# Patient Record
Sex: FEMALE | Race: WHITE | NOT HISPANIC OR LATINO | Employment: FULL TIME | URBAN - METROPOLITAN AREA
[De-identification: names, ages, dates, MRNs, and addresses within clinical notes are randomized per-mention and may not be internally consistent; named-entity substitution may affect disease eponyms.]

---

## 2019-02-27 ENCOUNTER — HOSPITAL ENCOUNTER (OUTPATIENT)
Dept: RADIOLOGY | Facility: MEDICAL CENTER | Age: 54
End: 2019-02-27
Attending: PHYSICIAN ASSISTANT
Payer: COMMERCIAL

## 2019-02-27 DIAGNOSIS — R10.11 ABDOMINAL PAIN, RIGHT UPPER QUADRANT: ICD-10-CM

## 2019-02-27 PROCEDURE — 76705 ECHO EXAM OF ABDOMEN: CPT

## 2019-03-04 ENCOUNTER — HOSPITAL ENCOUNTER (OUTPATIENT)
Dept: RADIOLOGY | Facility: MEDICAL CENTER | Age: 54
End: 2019-03-04
Attending: PHYSICIAN ASSISTANT
Payer: COMMERCIAL

## 2019-03-04 DIAGNOSIS — R11.2 NAUSEA AND VOMITING, INTRACTABILITY OF VOMITING NOT SPECIFIED, UNSPECIFIED VOMITING TYPE: ICD-10-CM

## 2019-03-04 DIAGNOSIS — R10.11 RIGHT UPPER QUADRANT PAIN: ICD-10-CM

## 2019-03-04 PROCEDURE — A9537 TC99M MEBROFENIN: HCPCS

## 2019-03-27 ENCOUNTER — APPOINTMENT (OUTPATIENT)
Dept: ADMISSIONS | Facility: MEDICAL CENTER | Age: 54
End: 2019-03-27
Payer: COMMERCIAL

## 2019-03-28 ENCOUNTER — ANESTHESIA EVENT (OUTPATIENT)
Dept: SURGERY | Facility: MEDICAL CENTER | Age: 54
End: 2019-03-28
Payer: COMMERCIAL

## 2019-03-28 DIAGNOSIS — Z01.812 PRE-OPERATIVE LABORATORY EXAMINATION: ICD-10-CM

## 2019-03-28 LAB
ANION GAP SERPL CALC-SCNC: 2 MMOL/L (ref 0–11.9)
BUN SERPL-MCNC: 7 MG/DL (ref 8–22)
CALCIUM SERPL-MCNC: 9.7 MG/DL (ref 8.5–10.5)
CHLORIDE SERPL-SCNC: 106 MMOL/L (ref 96–112)
CO2 SERPL-SCNC: 28 MMOL/L (ref 20–33)
CREAT SERPL-MCNC: 0.7 MG/DL (ref 0.5–1.4)
ERYTHROCYTE [DISTWIDTH] IN BLOOD BY AUTOMATED COUNT: 43.8 FL (ref 35.9–50)
GLUCOSE SERPL-MCNC: 78 MG/DL (ref 65–99)
HCT VFR BLD AUTO: 41.5 % (ref 37–47)
HGB BLD-MCNC: 13.3 G/DL (ref 12–16)
MCH RBC QN AUTO: 27.4 PG (ref 27–33)
MCHC RBC AUTO-ENTMCNC: 32 G/DL (ref 33.6–35)
MCV RBC AUTO: 85.6 FL (ref 81.4–97.8)
PLATELET # BLD AUTO: 308 K/UL (ref 164–446)
PMV BLD AUTO: 10.8 FL (ref 9–12.9)
POTASSIUM SERPL-SCNC: 4.4 MMOL/L (ref 3.6–5.5)
RBC # BLD AUTO: 4.85 M/UL (ref 4.2–5.4)
SODIUM SERPL-SCNC: 136 MMOL/L (ref 135–145)
WBC # BLD AUTO: 8.5 K/UL (ref 4.8–10.8)

## 2019-03-28 PROCEDURE — 85027 COMPLETE CBC AUTOMATED: CPT

## 2019-03-28 PROCEDURE — 80048 BASIC METABOLIC PNL TOTAL CA: CPT

## 2019-03-28 PROCEDURE — 36415 COLL VENOUS BLD VENIPUNCTURE: CPT

## 2019-03-28 RX ORDER — HYDROXYZINE 50 MG/1
100 TABLET, FILM COATED ORAL 2 TIMES DAILY
COMMUNITY
End: 2022-05-18

## 2019-03-28 RX ORDER — TRAMADOL HYDROCHLORIDE 50 MG/1
50 TABLET ORAL EVERY 4 HOURS PRN
COMMUNITY
End: 2022-05-18

## 2019-03-28 RX ORDER — GABAPENTIN 300 MG/1
300 CAPSULE ORAL 3 TIMES DAILY
COMMUNITY
End: 2022-05-18

## 2019-03-28 RX ORDER — CYCLOBENZAPRINE HCL 10 MG
10 TABLET ORAL 3 TIMES DAILY
COMMUNITY
End: 2022-09-27 | Stop reason: SDUPTHER

## 2019-03-28 RX ORDER — NAPROXEN 500 MG/1
500 TABLET ORAL 2 TIMES DAILY WITH MEALS
COMMUNITY
End: 2022-05-18

## 2019-03-29 ENCOUNTER — ANESTHESIA (OUTPATIENT)
Dept: SURGERY | Facility: MEDICAL CENTER | Age: 54
End: 2019-03-29
Payer: COMMERCIAL

## 2019-03-29 ENCOUNTER — HOSPITAL ENCOUNTER (OUTPATIENT)
Facility: MEDICAL CENTER | Age: 54
End: 2019-03-29
Attending: SURGERY | Admitting: SURGERY
Payer: COMMERCIAL

## 2019-03-29 VITALS
SYSTOLIC BLOOD PRESSURE: 146 MMHG | RESPIRATION RATE: 14 BRPM | BODY MASS INDEX: 24.36 KG/M2 | DIASTOLIC BLOOD PRESSURE: 94 MMHG | HEART RATE: 66 BPM | TEMPERATURE: 97.7 F | WEIGHT: 155.2 LBS | OXYGEN SATURATION: 96 % | HEIGHT: 67 IN

## 2019-03-29 DIAGNOSIS — G89.18 POST-OPERATIVE PAIN: ICD-10-CM

## 2019-03-29 LAB — PATHOLOGY CONSULT NOTE: NORMAL

## 2019-03-29 PROCEDURE — 160047 HCHG PACU  - EA ADDL 30 MINS PHASE II: Performed by: SURGERY

## 2019-03-29 PROCEDURE — 700101 HCHG RX REV CODE 250: Performed by: ANESTHESIOLOGY

## 2019-03-29 PROCEDURE — 700111 HCHG RX REV CODE 636 W/ 250 OVERRIDE (IP)

## 2019-03-29 PROCEDURE — 160002 HCHG RECOVERY MINUTES (STAT): Performed by: SURGERY

## 2019-03-29 PROCEDURE — 160048 HCHG OR STATISTICAL LEVEL 1-5: Performed by: SURGERY

## 2019-03-29 PROCEDURE — 160028 HCHG SURGERY MINUTES - 1ST 30 MINS LEVEL 3: Performed by: SURGERY

## 2019-03-29 PROCEDURE — 700102 HCHG RX REV CODE 250 W/ 637 OVERRIDE(OP): Performed by: ANESTHESIOLOGY

## 2019-03-29 PROCEDURE — 502571 HCHG PACK, LAP CHOLE: Performed by: SURGERY

## 2019-03-29 PROCEDURE — 501399 HCHG SPECIMAN BAG, ENDO CATC: Performed by: SURGERY

## 2019-03-29 PROCEDURE — 700111 HCHG RX REV CODE 636 W/ 250 OVERRIDE (IP): Performed by: ANESTHESIOLOGY

## 2019-03-29 PROCEDURE — 700101 HCHG RX REV CODE 250

## 2019-03-29 PROCEDURE — 160046 HCHG PACU - 1ST 60 MINS PHASE II: Performed by: SURGERY

## 2019-03-29 PROCEDURE — 160009 HCHG ANES TIME/MIN: Performed by: SURGERY

## 2019-03-29 PROCEDURE — A6402 STERILE GAUZE <= 16 SQ IN: HCPCS | Performed by: SURGERY

## 2019-03-29 PROCEDURE — 501582 HCHG TROCAR, THRD BLADED: Performed by: SURGERY

## 2019-03-29 PROCEDURE — 160025 RECOVERY II MINUTES (STATS): Performed by: SURGERY

## 2019-03-29 PROCEDURE — 500854 HCHG NEEDLE, INSUFFLATION FOR STEP: Performed by: SURGERY

## 2019-03-29 PROCEDURE — 160036 HCHG PACU - EA ADDL 30 MINS PHASE I: Performed by: SURGERY

## 2019-03-29 PROCEDURE — 160035 HCHG PACU - 1ST 60 MINS PHASE I: Performed by: SURGERY

## 2019-03-29 PROCEDURE — A9270 NON-COVERED ITEM OR SERVICE: HCPCS | Performed by: ANESTHESIOLOGY

## 2019-03-29 PROCEDURE — 88304 TISSUE EXAM BY PATHOLOGIST: CPT

## 2019-03-29 PROCEDURE — 160039 HCHG SURGERY MINUTES - EA ADDL 1 MIN LEVEL 3: Performed by: SURGERY

## 2019-03-29 PROCEDURE — 501838 HCHG SUTURE GENERAL: Performed by: SURGERY

## 2019-03-29 RX ORDER — ONDANSETRON 2 MG/ML
INJECTION INTRAMUSCULAR; INTRAVENOUS PRN
Status: DISCONTINUED | OUTPATIENT
Start: 2019-03-29 | End: 2019-03-29 | Stop reason: SURG

## 2019-03-29 RX ORDER — BUPIVACAINE HYDROCHLORIDE AND EPINEPHRINE 5; 5 MG/ML; UG/ML
INJECTION, SOLUTION EPIDURAL; INTRACAUDAL; PERINEURAL
Status: DISCONTINUED | OUTPATIENT
Start: 2019-03-29 | End: 2019-03-29 | Stop reason: HOSPADM

## 2019-03-29 RX ORDER — HYDROMORPHONE HYDROCHLORIDE 1 MG/ML
0.1 INJECTION, SOLUTION INTRAMUSCULAR; INTRAVENOUS; SUBCUTANEOUS
Status: DISCONTINUED | OUTPATIENT
Start: 2019-03-29 | End: 2019-03-29 | Stop reason: HOSPADM

## 2019-03-29 RX ORDER — MEPERIDINE HYDROCHLORIDE 25 MG/ML
6.25 INJECTION INTRAMUSCULAR; INTRAVENOUS; SUBCUTANEOUS
Status: DISCONTINUED | OUTPATIENT
Start: 2019-03-29 | End: 2019-03-29 | Stop reason: HOSPADM

## 2019-03-29 RX ORDER — DIPHENHYDRAMINE HYDROCHLORIDE 50 MG/ML
12.5 INJECTION INTRAMUSCULAR; INTRAVENOUS
Status: DISCONTINUED | OUTPATIENT
Start: 2019-03-29 | End: 2019-03-29 | Stop reason: HOSPADM

## 2019-03-29 RX ORDER — SODIUM CHLORIDE, SODIUM LACTATE, POTASSIUM CHLORIDE, CALCIUM CHLORIDE 600; 310; 30; 20 MG/100ML; MG/100ML; MG/100ML; MG/100ML
INJECTION, SOLUTION INTRAVENOUS CONTINUOUS
Status: DISCONTINUED | OUTPATIENT
Start: 2019-03-29 | End: 2019-03-29 | Stop reason: HOSPADM

## 2019-03-29 RX ORDER — CELECOXIB 200 MG/1
400 CAPSULE ORAL ONCE
Status: COMPLETED | OUTPATIENT
Start: 2019-03-29 | End: 2019-03-29

## 2019-03-29 RX ORDER — DEXAMETHASONE SODIUM PHOSPHATE 4 MG/ML
INJECTION, SOLUTION INTRA-ARTICULAR; INTRALESIONAL; INTRAMUSCULAR; INTRAVENOUS; SOFT TISSUE PRN
Status: DISCONTINUED | OUTPATIENT
Start: 2019-03-29 | End: 2019-03-29 | Stop reason: SURG

## 2019-03-29 RX ORDER — OXYCODONE HCL 5 MG/5 ML
5 SOLUTION, ORAL ORAL
Status: COMPLETED | OUTPATIENT
Start: 2019-03-29 | End: 2019-03-29

## 2019-03-29 RX ORDER — ONDANSETRON 2 MG/ML
4 INJECTION INTRAMUSCULAR; INTRAVENOUS
Status: COMPLETED | OUTPATIENT
Start: 2019-03-29 | End: 2019-03-29

## 2019-03-29 RX ORDER — CEFAZOLIN SODIUM 1 G/3ML
INJECTION, POWDER, FOR SOLUTION INTRAMUSCULAR; INTRAVENOUS PRN
Status: DISCONTINUED | OUTPATIENT
Start: 2019-03-29 | End: 2019-03-29 | Stop reason: SURG

## 2019-03-29 RX ORDER — HYDROCODONE BITARTRATE AND ACETAMINOPHEN 5; 325 MG/1; MG/1
1-2 TABLET ORAL EVERY 4 HOURS PRN
Qty: 30 TAB | Refills: 0 | Status: SHIPPED | OUTPATIENT
Start: 2019-03-29 | End: 2019-04-05

## 2019-03-29 RX ORDER — ACETAMINOPHEN 500 MG
1000 TABLET ORAL ONCE
Status: COMPLETED | OUTPATIENT
Start: 2019-03-29 | End: 2019-03-29

## 2019-03-29 RX ORDER — OXYCODONE HCL 5 MG/5 ML
10 SOLUTION, ORAL ORAL
Status: COMPLETED | OUTPATIENT
Start: 2019-03-29 | End: 2019-03-29

## 2019-03-29 RX ORDER — HYDROMORPHONE HYDROCHLORIDE 1 MG/ML
0.2 INJECTION, SOLUTION INTRAMUSCULAR; INTRAVENOUS; SUBCUTANEOUS
Status: DISCONTINUED | OUTPATIENT
Start: 2019-03-29 | End: 2019-03-29 | Stop reason: HOSPADM

## 2019-03-29 RX ORDER — HALOPERIDOL 5 MG/ML
1 INJECTION INTRAMUSCULAR
Status: DISCONTINUED | OUTPATIENT
Start: 2019-03-29 | End: 2019-03-29 | Stop reason: HOSPADM

## 2019-03-29 RX ORDER — HYDROMORPHONE HYDROCHLORIDE 1 MG/ML
0.4 INJECTION, SOLUTION INTRAMUSCULAR; INTRAVENOUS; SUBCUTANEOUS
Status: DISCONTINUED | OUTPATIENT
Start: 2019-03-29 | End: 2019-03-29 | Stop reason: HOSPADM

## 2019-03-29 RX ADMIN — CEFAZOLIN 2 G: 330 INJECTION, POWDER, FOR SOLUTION INTRAMUSCULAR; INTRAVENOUS at 08:38

## 2019-03-29 RX ADMIN — FENTANYL CITRATE 50 MCG: 50 INJECTION, SOLUTION INTRAMUSCULAR; INTRAVENOUS at 09:00

## 2019-03-29 RX ADMIN — MIDAZOLAM HYDROCHLORIDE 2 MG: 1 INJECTION, SOLUTION INTRAMUSCULAR; INTRAVENOUS at 08:30

## 2019-03-29 RX ADMIN — ONDANSETRON 4 MG: 2 INJECTION INTRAMUSCULAR; INTRAVENOUS at 10:05

## 2019-03-29 RX ADMIN — FENTANYL CITRATE 50 MCG: 50 INJECTION, SOLUTION INTRAMUSCULAR; INTRAVENOUS at 08:55

## 2019-03-29 RX ADMIN — FENTANYL CITRATE 100 MCG: 50 INJECTION, SOLUTION INTRAMUSCULAR; INTRAVENOUS at 08:43

## 2019-03-29 RX ADMIN — FENTANYL CITRATE 50 MCG: 50 INJECTION, SOLUTION INTRAMUSCULAR; INTRAVENOUS at 10:59

## 2019-03-29 RX ADMIN — ONDANSETRON 4 MG: 2 INJECTION INTRAMUSCULAR; INTRAVENOUS at 08:50

## 2019-03-29 RX ADMIN — ROCURONIUM BROMIDE 50 MG: 10 INJECTION, SOLUTION INTRAVENOUS at 08:43

## 2019-03-29 RX ADMIN — SODIUM CHLORIDE, SODIUM LACTATE, POTASSIUM CHLORIDE, CALCIUM CHLORIDE: 600; 310; 30; 20 INJECTION, SOLUTION INTRAVENOUS at 08:25

## 2019-03-29 RX ADMIN — PROPOFOL 200 MG: 10 INJECTION, EMULSION INTRAVENOUS at 08:43

## 2019-03-29 RX ADMIN — ACETAMINOPHEN 1000 MG: 500 TABLET, FILM COATED ORAL at 07:49

## 2019-03-29 RX ADMIN — FENTANYL CITRATE 50 MCG: 50 INJECTION, SOLUTION INTRAMUSCULAR; INTRAVENOUS at 08:50

## 2019-03-29 RX ADMIN — SUGAMMADEX 200 MG: 100 INJECTION, SOLUTION INTRAVENOUS at 09:05

## 2019-03-29 RX ADMIN — DEXAMETHASONE SODIUM PHOSPHATE 8 MG: 4 INJECTION, SOLUTION INTRAMUSCULAR; INTRAVENOUS at 08:45

## 2019-03-29 RX ADMIN — CELECOXIB 400 MG: 200 CAPSULE ORAL at 07:49

## 2019-03-29 RX ADMIN — OXYCODONE HYDROCHLORIDE 5 MG: 5 SOLUTION ORAL at 11:08

## 2019-03-29 RX ADMIN — SODIUM CHLORIDE, SODIUM LACTATE, POTASSIUM CHLORIDE, CALCIUM CHLORIDE: 600; 310; 30; 20 INJECTION, SOLUTION INTRAVENOUS at 06:31

## 2019-03-29 ASSESSMENT — PAIN SCALES - GENERAL: PAIN_LEVEL: 0

## 2019-03-29 NOTE — ANESTHESIA PREPROCEDURE EVALUATION
Biliary dyskinesia    Relevant Problems   (+) ALCOHOL ABUSE, IN REMISSION   (+) Depression   asthma    Physical Exam    Airway   Mallampati: I  TM distance: >3 FB  Neck ROM: full       Cardiovascular - normal exam  Rhythm: regular  Rate: normal  (-) murmur     Dental - normal exam  (+) upper dentures, lower dentures         Pulmonary - normal exam  Breath sounds clear to auscultation     Abdominal    Neurological - normal exam                 Anesthesia Plan    ASA 2       Plan - general       Airway plan will be ETT        Induction: intravenous    Postoperative Plan: Postoperative administration of opioids is intended.    Pertinent diagnostic labs and testing reviewed    Informed Consent:    Anesthetic plan and risks discussed with patient.    Use of blood products discussed with: patient whom consented to blood products.

## 2019-03-29 NOTE — OR NURSING
0919 Patient arrived from OR on gurney. Report received from anesthesia and RN. Oral airway in place. Will continue to monitor.   0958 pt woke up, airway out.   1200  at bedside, d/c instructions reviewed with pt and , copy given. Per pt she get very nauseated with carmen, Dr. Enamorado in OR, notified.   1300 Pt got prescription for percocet.  1315 d/c criteria met, PIV out, d/cd via sara tinajero with patient.

## 2019-03-29 NOTE — DISCHARGE INSTRUCTIONS
ACTIVITY: Rest and take it easy for the first 24 hours.  A responsible adult is recommended to remain with you during that time.  It is normal to feel sleepy.  We encourage you to not do anything that requires balance, judgment or coordination.    MILD FLU-LIKE SYMPTOMS ARE NORMAL. YOU MAY EXPERIENCE GENERALIZED MUSCLE ACHES, THROAT IRRITATION, HEADACHE AND/OR SOME NAUSEA.    FOR 24 HOURS DO NOT:  Drive, operate machinery or run household appliances.  Drink beer or alcoholic beverages.   Make important decisions or sign legal documents.    SPECIAL INSTRUCTIONS: Laparoscopic Cholecystectomy D/C instructions:     1. DIET: Upon discharge from the hospital you may resume your normal preoperative diet. Depending on how you are feeling and whether you have nausea or not, you may wish to stay with a bland diet for the first few days. However, you can advance this as quickly as you feel ready.     2. ACTIVITIES: After discharge from the hospital, you may resume full routine activities. However, there should be no heavy lifting (greater than 15 pounds) and no strenuous activities until after your follow-up visit. Otherwise, routine activities of daily living are acceptable.     3. DRIVING: You may drive whenever you are off pain medications and are able to perform the activities needed to drive, i.e. turning, bending, twisting, etc.     4. BATHING: You may get the wound wet at any time after leaving the hospital. You may shower, but do not submerge in a bath for at least a week. Dressings may come off after 48 hours.     5. BOWEL FUNCTION: A few patients, after this operation, will develop either frequent or loose stools after meals. This usually corrects itself after a few days, to a few weeks. If this occurs, do not worry; it is not unusual and will resolve. Much more common than loose stools, is constipation. The combination of pain medication and decreased activity level can cause constipation in otherwise normal  patients. If you feel this is occurring, take a laxative (Milk of Magnesia, Ex-Lax, Senokot, etc.) until the problem has resolved.     6. PAIN MEDICATION: You will be given a prescription for pain medication at discharge. Please take these as directed. It is important to remember not to take medications on an empty stomach as this may cause nausea.      It is also helpful to take other non-narcotic over the counter medications to help with pain control and to decrease the need for narcotic medications. I recommend ibuprofen, taken every 8 hours, dosing as directed on the medication bottle.     It is useful to slowly decrease the number of narcotic pain medications you take starting the first day after surgery, as you are able. If you need a refill, Dr. Enamorado's office will provide you with one refill at your post-operative visit. After this, no more narcotic pain medications will be given.      7.CALL IF YOU HAVE: (1) Fevers to more than 1010 F, (2) Unusual chest or leg pain, (3) Drainage or fluid from incision that may be foul smelling, increased tenderness or soreness at the wound or the wound edges are no longer together, redness or swelling at the incision site. Please do not hesitate to call with any other questions.      8. APPOINTMENT: Contact our office at [phone] for a follow-up appointment in 1 to 2 weeks following your procedure.     If you have any additional questions, please do not hesitate to call the office and speak to either myself or the physician on call.     Office address:  [address] [place] [address] [id number]  [place] [id number]     Astrid Enamorado M.D.  [facility]  [phone]       DIET: To avoid nausea, slowly advance diet as tolerated, avoiding spicy or greasy foods for the first day.  Add more substantial food to your diet according to your physician's instructions.  Babies can be fed formula or breast milk as soon as they are hungry.  INCREASE FLUIDS AND FIBER TO AVOID  CONSTIPATION.        FOLLOW-UP APPOINTMENT:  A follow-up appointment should be arranged with your doctor; call to schedule.    You should CALL YOUR PHYSICIAN if you develop:  Fever greater than 101 degrees F.  Pain not relieved by medication, or persistent nausea or vomiting.  Excessive bleeding (blood soaking through dressing) or unexpected drainage from the wound.  Extreme redness or swelling around the incision site, drainage of pus or foul smelling drainage.  Inability to urinate or empty your bladder within 8 hours.  Problems with breathing or chest pain.    You should call 911 if you develop problems with breathing or chest pain.  If you are unable to contact your doctor or surgical center, you should go to the nearest emergency room or urgent care center.  Physician's telephone #: Dr. Enamorado [phone]    If any questions arise, call your doctor.  If your doctor is not available, please feel free to call the Surgical Center at [phone].  The Center is open Monday through Friday from 7AM to 7PM.  You can also call the [email or web address] HOTLINE open 24 hours/day, 7 days/week and speak to a nurse at [phone], or toll free at [phone].    A registered nurse may call you a few days after your surgery to see how you are doing after your procedure.    MEDICATIONS: Resume taking daily medication.  Take prescribed pain medication with food.  If no medication is prescribed, you may take non-aspirin pain medication if needed.  PAIN MEDICATION CAN BE VERY CONSTIPATING.  Take a stool softener or laxative such as senokot, pericolace, or milk of magnesia if needed.    Prescription given for norco.  Last pain medication given at 11:00 am next dose at 3 pm.    If your physician has prescribed pain medication that includes Acetaminophen (Tylenol), do not take additional Acetaminophen (Tylenol) while taking the prescribed medication.    Depression / Suicide Risk    As you are discharged from this [facility],  it is important to learn how to keep safe from harming yourself.    Recognize the warning signs:  · Abrupt changes in personality, positive or negative- including increase in energy   · Giving away possessions  · Change in eating patterns- significant weight changes-  positive or negative  · Change in sleeping patterns- unable to sleep or sleeping all the time   · Unwillingness or inability to communicate  · Depression  · Unusual sadness, discouragement and loneliness  · Talk of wanting to die  · Neglect of personal appearance   · Rebelliousness- reckless behavior  · Withdrawal from people/activities they love  · Confusion- inability to concentrate     If you or a loved one observes any of these behaviors or has concerns about self-harm, here's what you can do:  · Talk about it- your feelings and reasons for harming yourself  · Remove any means that you might use to hurt yourself (examples: pills, rope, extension cords, firearm)  · Get professional help from the community (Mental Health, Substance Abuse, psychological counseling)  · Do not be alone:Call your Safe Contact- someone whom you trust who will be there for you.  · Call your local CRISIS HOTLINE 100-1541 or 515-571-6507  · Call your local Children's Mobile Crisis Response Team [place] (629) 856-0806 or www.[employer]  · Call the toll free National Suicide Prevention Hotlines   · National Suicide Prevention Lifeline 801-859-NMEK (5484)  · National Hope Line Network 800-SUICIDE (880-4902)

## 2019-03-29 NOTE — ANESTHESIA TIME REPORT
Anesthesia Start and Stop Event Times     Date Time Event    3/29/2019 0834 Anesthesia Start     0921 Anesthesia Stop        Responsible Staff  03/29/19    Name Role Begin End    Justin Barker M.D. Anesth 0834 0921        Preop Diagnosis (Free Text):  Pre-op Diagnosis     BILIARY DYSKINESIA, CHRONIC CHOLECYSTITIS        Preop Diagnosis (Codes):  Diagnosis Information     Diagnosis Code(s):         Post op Diagnosis  Biliary dyskinesia      Premium Reason  Non-Premium    Comments:

## 2019-03-29 NOTE — ANESTHESIA PROCEDURE NOTES
Airway  Date/Time: 3/29/2019 8:43 AM  Performed by: BRIAN DORADO  Authorized by: BRIAN DORADO     Location:  OR  Urgency:  Elective  Indications for Airway Management:  Anesthesia  Spontaneous Ventilation: absent    Sedation Level:  Deep  Preoxygenated: Yes    Patient Position:  Sniffing  Mask Difficulty Assessment:  2 - vent by mask + OA or adjuvant +/- NMBA  Final Airway Type:  Endotracheal airway  Final Endotracheal Airway:  ETT  Cuffed: Yes    Technique Used for Successful ETT Placement:  Direct laryngoscopy  Insertion Site:  Oral  Blade Type:  Eleazar  Laryngoscope Blade/Videolaryngoscope Blade Size:  3  ETT Size (mm):  7.0  Measured from:  Teeth  ETT to Teeth (cm):  21  Placement Verified by: auscultation and capnometry    Cormack-Lehane Classification:  Grade I - full view of glottis  Number of Attempts at Approach:  1

## 2019-03-29 NOTE — ANESTHESIA QCDR
2019 Qualified Clinical Data Registry (for Quality Improvement)     Postoperative nausea/vomiting risk protocol (Adult = 18 yrs and Pediatric 3-17 yrs)- (430 and 463)  General inhalation anesthetic (NOT TIVA) with PONV risk factors: Yes  Provision of anti-emetic therapy with at least 2 different classes of agents:    Patient DID NOT receive anti-emetic therapy and reason is documented in Medical Record:      Multimodal Pain Management- (AQI59)  Patient undergoing Elective Surgery (i.e. Outpatient, or ASC, or Prescheduled Surgery prior to Hospital Admission): Yes  Use of Multimodal Pain Management, two or more drugs and/or interventions, NOT including systemic opioids: Yes   Exception: Documented allergy to multiple classes of analgesics:  N/A    PACU assessment of acute postoperative pain prior to Anesthesia Care End- Applies to Patients Age = 18- (ABG7)  Initial PACU pain score is which of the following: < 7/10  Patient unable to report pain score: N/A    Post-anesthetic transfer of care checklist/protocol to PACU/ICU- (426 and 427)  Upon conclusion of case, patient transferred to which of the following locations: PACU/Non-ICU  Use of transfer checklist/protocol: Yes  Exclusion: Service Performed in Patient Hospital Room (and thus did not require transfer): N/A    PACU Reintubation- (AQI31)  General anesthesia requiring endotracheal intubation (ETT) along with subsequent extubation in OR or PACU: Yes  Required reintubation in the PACU: No   Extubation was a planned trial documented in the medical record prior to removal of the original airway device:  N/A    Unplanned admission to ICU related to anesthesia service up through end of PACU care- (MD51)  Unplanned admission to ICU (not initially anticipated at anesthesia start time): No

## 2019-03-29 NOTE — ANESTHESIA POSTPROCEDURE EVALUATION
Patient: Hui Brian    Procedure Summary     Date:  03/29/19 Room / Location:  [facility] ROOM 24 / SURGERY SAME DAY [facility]    Anesthesia Start:  0834 Anesthesia Stop:  0921    Procedure:  CHOLECYSTECTOMY, LAPAROSCOPIC (N/A Abdomen) Diagnosis:  (BILIARY DYSKINESIA, CHRONIC CHOLECYSTITIS)    Surgeon:  Astrid Enamorado M.D. Responsible Provider:  Justin Barker M.D.    Anesthesia Type:  general ASA Status:  2          Final Anesthesia Type: general  Last vitals  BP   Blood Pressure: 146/94, NIBP: 147/78    Temp   36.5 °C (97.7 °F)    Pulse   Pulse: 62, Heart Rate (Monitored): 61   Resp   14    SpO2   100 %      Anesthesia Post Evaluation    Patient location during evaluation: PACU  Patient participation: complete - patient participated  Level of consciousness: awake and alert  Pain score: 0    Airway patency: patent  Anesthetic complications: no  Cardiovascular status: hemodynamically stable  Respiratory status: acceptable  Hydration status: euvolemic    PONV: none           Nurse Pain Score: 0 (NPRS)

## 2019-03-29 NOTE — OP REPORT
Operative Report    Date: 3/29/2019    Surgeon: Astrid Enamorado M.D.    Assistant: TOMA Castellano    Anesthesiologist: Eli LOUIE    Preoperative Diagnosis: K82.8 biliary dyskinesia    Postoperative Diagnosis: Chronic cholecystitis without stones K81.1    Procedure Performed: 88730 Laparoscopy, surgical; cholecystectomy    Indications: This is a 53 y.o. female who presented with abdominal pain. History, physical and diagnostic studies are consistent with biliary dyskinesia.    An extensive procedures, alternative, risks and questions conference was held with the patient in regard to the surgical treatment of biliary dyskinesia. The patient was counseled regarding the benefits of the operation, namely, removal of gallbladder and alleviation of her symptoms. The patient was made aware of the alternatives, including operative and non-operative management. The risks of bleeding, infection, damage to surrounding structures, need for reoperation, need for open operation, bile duct injury, stroke, MI, and death were discussed with the patient. The patient was given a chance to ask questions, and all her questions were answered. Discussion was undertaken with Layman's terms. The patient demonstrated adequate understanding, seemed pleased with the plan, and wish to proceed. Consent was given in clear state of mind.  Consent was signed.     Findings: chronic appearing cholecystitis with scarring in the hepatoduodenal ligament    Procedure in detail: The patient was brought to the operating room and was placed in the supine position where general endotracheal anesthesia was induced. SCDs were in place and functioning. Preoperative antibiotics of ancef were given before incision time. The patient's abdomen was prepped and draped in the usual sterile fashion.    After infiltration of local anesthetic, a skin incision was made to accommodate a 5 mm port infra-umbilically. We then used the Veress needle to access the  peritoneum, and after saline drop test, we insufflated to 15 mmHg. We then placed the 5mm 30 degree camera and inspected the abdomen for evidence of trauma secondary to Veress needle or port placement, and found none.    Utilizing the 30-degree laparoscope with the patient in the reverse Trendelenburg position, and after infiltration of local anesthetic, an additional 11-mm port was inserted into the epigastrium just to the right of the midline. Then two 5-mm ports were inserted in the midclavicular and anterior axillary lines, in the right upper quadrant, all under direct visualization.    The gallbladder was identified, it appeared chronically scarred. It was carefully dissected off the duodenum sharoply. The gallbladder was retracted cephalad and caudally using gallbladder graspers. Using the [place], we were able to peel the overlying peritoneum off the cystic duct, and circumferentially dissect it. We used electrocautery to futher remove the peritoneum off the gallbladder. We then were able to further dissect Calot's triangle until we identified the cystic artery, which was circumferrentially dissected.     We then doubly clipped the cystic duct distally and divided it. We then doubly clipped the cystic artery  and divided it.Then, using electrocautery, we removed the gallbladder from the liver bed. After ensuring gallbladder bed hemostasis with cautery, we removed the gallbladder and placed it in an endocatch bag, and removed it from the epigastric port site.    The right upper quadrant was irrigated copiously with normal saline solution. We inspected the cystic duct and artery stumps, and found them to be intact. The liver bed was hemostatic.    The trocars were removed under direct visualization.    The fascia on the all port sites >10 mm were closed with Vicryl sutures. The skin of all incisions was closed with running subcuticular suture.    All sponge, needle, and instrument counts were reported as  correct at the end of the procedure. The patient tolerated the procedure well and left the operating room for the recovery room in stable and satisfactory condition.    Estimated Blood Loss: minimal     Specimens: gallbladder for permanent pathology    Complications: none apparent     Drains: none     Disposition: stable, extubated, to PACU    Astrid Enamorado M.D.  [facility]  [phone]

## 2019-04-10 ENCOUNTER — HOSPITAL ENCOUNTER (EMERGENCY)
Facility: MEDICAL CENTER | Age: 54
End: 2019-04-10
Payer: COMMERCIAL

## 2019-04-10 VITALS
OXYGEN SATURATION: 98 % | SYSTOLIC BLOOD PRESSURE: 154 MMHG | DIASTOLIC BLOOD PRESSURE: 63 MMHG | HEART RATE: 93 BPM | RESPIRATION RATE: 16 BRPM | TEMPERATURE: 97.6 F | HEIGHT: 67 IN | BODY MASS INDEX: 22.49 KG/M2 | WEIGHT: 143.3 LBS

## 2019-04-10 PROCEDURE — 302449 STATCHG TRIAGE ONLY (STATISTIC)

## 2019-04-10 NOTE — ED TRIAGE NOTES
BIB [facility] to triage w/ c/o nausea x 4 days.  Seen at [facility] ER 3 days ago for the same.  Reports recent chris on 3/29.  Denies pain.  Taking zofran at home w/o relief.  Pt tearful, states depressed about continued nausea. Denies SI.

## 2019-04-19 ENCOUNTER — OFFICE VISIT (OUTPATIENT)
Dept: URGENT CARE | Facility: CLINIC | Age: 54
End: 2019-04-19
Payer: COMMERCIAL

## 2019-04-19 VITALS
BODY MASS INDEX: 22.44 KG/M2 | RESPIRATION RATE: 14 BRPM | HEIGHT: 67 IN | HEART RATE: 100 BPM | SYSTOLIC BLOOD PRESSURE: 124 MMHG | TEMPERATURE: 97.7 F | WEIGHT: 143 LBS | DIASTOLIC BLOOD PRESSURE: 78 MMHG | OXYGEN SATURATION: 94 %

## 2019-04-19 DIAGNOSIS — L08.9 BACTERIAL SKIN INFECTION: ICD-10-CM

## 2019-04-19 DIAGNOSIS — B96.89 BACTERIAL SKIN INFECTION: ICD-10-CM

## 2019-04-19 DIAGNOSIS — B37.31 VAGINAL YEAST INFECTION: ICD-10-CM

## 2019-04-19 DIAGNOSIS — K59.03 DRUG-INDUCED CONSTIPATION: ICD-10-CM

## 2019-04-19 DIAGNOSIS — L03.012 PARONYCHIA OF FINGER OF LEFT HAND: ICD-10-CM

## 2019-04-19 PROCEDURE — 99203 OFFICE O/P NEW LOW 30 MIN: CPT | Performed by: FAMILY MEDICINE

## 2019-04-19 RX ORDER — FOLIC ACID 1 MG/1
1 TABLET ORAL
Refills: 7 | COMMUNITY
Start: 2019-03-20 | End: 2022-05-18

## 2019-04-19 RX ORDER — FLUCONAZOLE 150 MG/1
TABLET ORAL
Qty: 2 TAB | Refills: 0 | Status: SHIPPED | OUTPATIENT
Start: 2019-04-19 | End: 2021-08-07 | Stop reason: SDUPTHER

## 2019-04-19 RX ORDER — SULFAMETHOXAZOLE AND TRIMETHOPRIM 800; 160 MG/1; MG/1
1 TABLET ORAL 2 TIMES DAILY
Qty: 20 TAB | Refills: 0 | Status: SHIPPED | OUTPATIENT
Start: 2019-04-19 | End: 2019-04-29

## 2019-04-19 RX ORDER — OXYCODONE HYDROCHLORIDE AND ACETAMINOPHEN 5; 325 MG/1; MG/1
TABLET ORAL
Refills: 0 | COMMUNITY
Start: 2019-03-29 | End: 2022-05-18

## 2019-04-19 RX ORDER — ALBUTEROL SULFATE 90 UG/1
AEROSOL, METERED RESPIRATORY (INHALATION)
Refills: 2 | COMMUNITY
Start: 2019-02-14 | End: 2022-05-18

## 2019-04-19 RX ORDER — POLYETHYLENE GLYCOL 3350 17 G/17G
17 POWDER, FOR SOLUTION ORAL DAILY
Qty: 1 BOTTLE | Refills: 3 | Status: SHIPPED | OUTPATIENT
Start: 2019-04-19 | End: 2022-05-18

## 2019-04-19 RX ORDER — HYDROXYZINE PAMOATE 50 MG/1
125 CAPSULE ORAL 2 TIMES DAILY
Refills: 2 | COMMUNITY
Start: 2019-03-22 | End: 2022-09-27 | Stop reason: SDUPTHER

## 2019-04-19 RX ORDER — DOCUSATE SODIUM 100 MG/1
CAPSULE, LIQUID FILLED ORAL
Refills: 0 | COMMUNITY
Start: 2019-03-21 | End: 2022-05-18

## 2019-04-19 ASSESSMENT — ENCOUNTER SYMPTOMS
DIZZINESS: 0
CHILLS: 0
BRUISES/BLEEDS EASILY: 0
WHEEZING: 0
FEVER: 0
EYE DISCHARGE: 0
VOMITING: 0
SORE THROAT: 0
DIARRHEA: 0
CONSTIPATION: 1
HEADACHES: 0
SHORTNESS OF BREATH: 0
BACK PAIN: 0
ABDOMINAL PAIN: 0
EYE REDNESS: 0
NAUSEA: 0
PALPITATIONS: 0
DEPRESSION: 0
COUGH: 0

## 2019-04-20 NOTE — PROGRESS NOTES
Subjective:      Hui Brian is a 53 y.o. female who presents with Finger Pain        Patient reports infection of left middle finger x5 days which has been getting progressively worse and she noticed an abscess which she has been draining herself over the last couple days. Also reports that she had lap chris on 3/29 and that one of the port sites has also become infected and has been draining, but seems to be draining less (and improving) over the last few days. Denies fever/chills. States that she became severely constipated 2/2 pain pills following surgery and she ran out of her stool softeners. Also requests diflucan as she usually develops vaginal yeast infection with antibiotic use.        Review of Systems   Constitutional: Negative for chills and fever.   HENT: Negative for congestion and sore throat.    Eyes: Negative for discharge and redness.   Respiratory: Negative for cough, shortness of breath and wheezing.    Cardiovascular: Negative for chest pain and palpitations.   Gastrointestinal: Positive for constipation. Negative for abdominal pain, diarrhea, nausea and vomiting.   Genitourinary: Negative for dysuria, frequency and urgency.   Musculoskeletal: Negative for back pain and joint pain.   Skin: Negative for itching and rash.        +erythema and draining from surgical site on abdomen and +swelling, erythema and drainage left middle finger   Neurological: Negative for dizziness and headaches.   Endo/Heme/Allergies: Negative for environmental allergies. Does not bruise/bleed easily.   Psychiatric/Behavioral: Negative for depression and suicidal ideas.   All other systems reviewed and are negative.    Past Medical History:   Diagnosis Date   • ALCOHOL ABUSE, IN REMISSION    • ASTHMA     prn inhaler   • Dental disorder     full dentures   • Depression     PTSD/anxiety   • Indigestion    • Pain     hands/knees     Social History   Substance Use Topics   • Smoking status: Current Every Day  "Smoker     Packs/day: 0.50     Years: 20.00     Types: Cigarettes   • Smokeless tobacco: Never Used   • Alcohol use Yes      Comment: hx etoh abuse     Past Surgical History:   Procedure Laterality Date   • BOUCHRA BY LAPAROSCOPY N/A 3/29/2019    Procedure: CHOLECYSTECTOMY, LAPAROSCOPIC;  Surgeon: Astrid Enamorado M.D.;  Location: SURGERY SAME DAY [facility];  Service: General   • OTHER  1998    deviated nasal septum   • OTHER  1992    face recontruction due to dog attack     Allergies   Allergen Reactions   • Erythromycin Hives   • Tape Itching   • Tetanus Toxoid Itching     Current Outpatient Prescriptions on File Prior to Visit   Medication Sig Dispense Refill   • gabapentin (NEURONTIN) 300 MG Cap Take 300 mg by mouth 3 times a day. 1 tab am, 1 tab 1200, 2 tabs pm     • cyclobenzaprine (FLEXERIL) 10 MG Tab Take 10 mg by mouth 3 times a day.     • tramadol (ULTRAM) 50 MG Tab Take 50 mg by mouth every four hours as needed.     • naproxen (NAPROSYN) 500 MG Tab Take 500 mg by mouth 2 times a day, with meals.     • hydrOXYzine HCl (ATARAX) 50 MG Tab Take 100 mg by mouth 2 times a day.       No current facility-administered medications on file prior to visit.           Objective:     Ht 1.702 m (5' 7\")   Wt 64.9 kg (143 lb)   LMP 12/03/2009   BMI 22.40 kg/m²      Physical Exam   Constitutional: She is oriented to person, place, and time. She appears well-developed and well-nourished. No distress.   HENT:   Head: Normocephalic and atraumatic.   Right Ear: External ear normal.   Left Ear: External ear normal.   Nose: Nose normal.   Mouth/Throat: Oropharynx is clear and moist.   Eyes: Conjunctivae and EOM are normal.   Neck: Normal range of motion.   Cardiovascular: Normal rate and regular rhythm.    Pulmonary/Chest: Effort normal and breath sounds normal. No respiratory distress.   Abdominal: Soft. Bowel sounds are normal. She exhibits no distension. There is no tenderness.   Musculoskeletal: Normal range of motion. " She exhibits no edema or deformity.   Lymphadenopathy:     She has no cervical adenopathy.   Neurological: She is alert and oriented to person, place, and time. No cranial nerve deficit. She exhibits normal muscle tone.   Skin: Skin is warm and dry. Capillary refill takes less than 2 seconds. No rash noted. She is not diaphoretic.        Erythema surrounding surgical site on abdomen with small amount of drainage noted   Swelling, redness and tenderness of distal left middle finger with small open skin lesion   Psychiatric: She has a normal mood and affect. Her behavior is normal. Thought content normal.   Nursing note and vitals reviewed.         Assessment/Plan:     1. Bacterial skin infection  sulfamethoxazole-trimethoprim (BACTRIM DS) 800-160 MG tablet   2. Vaginal yeast infection  fluconazole (DIFLUCAN) 150 MG tablet   3. Drug-induced constipation  polyethylene glycol 3350 (MIRALAX) Powder   4. Paronychia of finger of left hand       - Wash infected skin with soap and water daily. Keep skin clean and dry.    - Start Bactrim DS BID x 10 days  - Start Miralax daily   - CALL YOUR SURGEON on Monday   - Monitor for signs of worsening infection or fever/chills     Differential diagnosis, natural history, supportive care discussed. Follow-up with primary care provider within 7-10 days, emergency room precautions discussed.  Patient and/or family appears understanding of information.

## 2019-07-06 ENCOUNTER — OFFICE VISIT (OUTPATIENT)
Dept: URGENT CARE | Facility: CLINIC | Age: 54
End: 2019-07-06
Payer: MEDICAID

## 2019-07-06 VITALS
TEMPERATURE: 97 F | BODY MASS INDEX: 23.54 KG/M2 | RESPIRATION RATE: 14 BRPM | HEART RATE: 80 BPM | WEIGHT: 150 LBS | HEIGHT: 67 IN | SYSTOLIC BLOOD PRESSURE: 122 MMHG | DIASTOLIC BLOOD PRESSURE: 82 MMHG | OXYGEN SATURATION: 95 %

## 2019-07-06 DIAGNOSIS — Z02.9 ADMINISTRATIVE ENCOUNTER: ICD-10-CM

## 2019-07-06 DIAGNOSIS — K52.9 GASTROENTERITIS: ICD-10-CM

## 2019-07-06 PROCEDURE — 99212 OFFICE O/P EST SF 10 MIN: CPT | Performed by: NURSE PRACTITIONER

## 2019-07-06 ASSESSMENT — ENCOUNTER SYMPTOMS
CARDIOVASCULAR NEGATIVE: 1
BACK PAIN: 0
MYALGIAS: 0
ABDOMINAL PAIN: 0
NUMBER OF EPISODES OF EMESIS TODAY: 1
SHORTNESS OF BREATH: 0
CONSTIPATION: 0
VOMITING: 1
NEUROLOGICAL NEGATIVE: 1
HEADACHES: 0
WHEEZING: 0
DIARRHEA: 0
CHILLS: 0
NAUSEA: 0
FLANK PAIN: 0
RESPIRATORY NEGATIVE: 1
NECK PAIN: 0
EYES NEGATIVE: 1
DIZZINESS: 0
FEVER: 0

## 2019-07-06 NOTE — LETTER
July 6, 2019         Patient: Hui Brian   YOB: 1965   Date of Visit: 7/6/2019           To Whom it May Concern:    Hui Brian was seen in my clinic on 7/6/2019. She is ok to return to full duty work 7/6/2019.    If you have any questions or concerns, please don't hesitate to call.        Sincerely,           TINO Flores.  Electronically Signed

## 2019-07-06 NOTE — PROGRESS NOTES
"Subjective:   Hui Brian is a 53 y.o. female who presents for Letter for School/Work (clearance for work from vomiting )        Emesis   This is a new problem. The current episode started in the past 7 days (Started 2 days ago, states symptoms have been resolved for more than 24 hours. Needs work excuse to return to work). The problem occurs intermittently. The problem has been resolved. Associated symptoms include vomiting (resolved). Pertinent negatives include no abdominal pain, chest pain, chills, fever, headaches, myalgias, nausea or neck pain. She has tried nothing for the symptoms. The treatment provided significant relief.        Review of Systems   Constitutional: Negative for chills and fever.   Eyes: Negative.    Respiratory: Negative.  Negative for shortness of breath and wheezing.    Cardiovascular: Negative.  Negative for chest pain.   Gastrointestinal: Positive for vomiting (resolved). Negative for abdominal pain, constipation, diarrhea and nausea.   Genitourinary: Negative for dysuria, flank pain, frequency, hematuria and urgency.   Musculoskeletal: Negative for back pain, myalgias and neck pain.   Skin: Negative.    Neurological: Negative.  Negative for dizziness and headaches.     Allergies   Allergen Reactions   • Erythromycin Hives   • Tape Itching   • Tetanus Toxoid Itching      Objective:   /82   Pulse 80   Temp 36.1 °C (97 °F) (Temporal)   Resp 14   Ht 1.702 m (5' 7\")   Wt 68 kg (150 lb)   LMP 12/03/2009   SpO2 95%   BMI 23.49 kg/m²   Physical Exam   Constitutional: She is oriented to person, place, and time. She appears well-developed and well-nourished. No distress.   HENT:   Right Ear: Hearing normal.   Left Ear: Hearing normal.   Mouth/Throat: Oropharynx is clear and moist and mucous membranes are normal.   Eyes: Pupils are equal, round, and reactive to light. Conjunctivae are normal.   Cardiovascular: Normal rate, regular rhythm and normal heart sounds.    No " murmur heard.  Pulmonary/Chest: Effort normal and breath sounds normal. No respiratory distress.   Abdominal: Soft. Normal appearance and bowel sounds are normal. She exhibits no distension. There is no hepatosplenomegaly. There is no tenderness. There is no rigidity, no rebound, no guarding and no CVA tenderness.   Neurological: She is alert and oriented to person, place, and time.   Skin: Skin is warm and dry. Capillary refill takes less than 2 seconds. No rash noted. She is not diaphoretic.   Psychiatric: She has a normal mood and affect. Her speech is normal and behavior is normal. Judgment and thought content normal.   Vitals reviewed.        Assessment/Plan:   Assessment    1. Gastroenteritis    2. Administrative encounter    Symptoms resolved and work note provided.    Differential diagnosis, natural history, supportive care, and indications for immediate follow-up discussed.

## 2021-03-15 DIAGNOSIS — Z23 NEED FOR VACCINATION: ICD-10-CM

## 2021-08-07 DIAGNOSIS — B37.31 VAGINAL YEAST INFECTION: ICD-10-CM

## 2021-08-07 RX ORDER — FLUCONAZOLE 150 MG/1
TABLET ORAL
Qty: 2 TABLET | Refills: 0 | Status: SHIPPED
Start: 2021-08-07 | End: 2022-05-18

## 2021-10-20 ENCOUNTER — OFFICE VISIT (OUTPATIENT)
Dept: URGENT CARE | Facility: CLINIC | Age: 56
End: 2021-10-20
Payer: MEDICAID

## 2021-10-20 VITALS
BODY MASS INDEX: 24.33 KG/M2 | TEMPERATURE: 97.4 F | HEIGHT: 67 IN | DIASTOLIC BLOOD PRESSURE: 82 MMHG | WEIGHT: 155 LBS | SYSTOLIC BLOOD PRESSURE: 124 MMHG | HEART RATE: 75 BPM | RESPIRATION RATE: 16 BRPM | OXYGEN SATURATION: 99 %

## 2021-10-20 DIAGNOSIS — L56.4 POLYMORPHOUS LIGHT ERUPTION: ICD-10-CM

## 2021-10-20 PROCEDURE — 99214 OFFICE O/P EST MOD 30 MIN: CPT | Performed by: PHYSICIAN ASSISTANT

## 2021-10-20 RX ORDER — METHYLPREDNISOLONE 4 MG/1
TABLET ORAL
Qty: 21 TABLET | Refills: 0 | Status: SHIPPED | OUTPATIENT
Start: 2021-10-20 | End: 2022-05-18

## 2021-10-20 ASSESSMENT — ENCOUNTER SYMPTOMS
FEVER: 0
ABDOMINAL PAIN: 0
PALPITATIONS: 0
CHILLS: 0
DIZZINESS: 0
TINGLING: 0
DIAPHORESIS: 0
NAUSEA: 0
HEADACHES: 0
MYALGIAS: 0
VOMITING: 0
COUGH: 0
WEIGHT LOSS: 0

## 2021-10-20 NOTE — PROGRESS NOTES
Subjective:   Hui Brian is a 56 y.o. female who presents for Allergic Reaction (was on prednisone, when she stopped it the itching started again)      HPI:  This is a very pleasant 56-year-old female with a past medical history significant for polymorphous light eruption.  She had a recent outbreak 1 month ago.  She was placed on oral steroids and topical steroids and symptoms quickly resolved.  States she has been out in the sun and has had another acute outbreak.  States her arms are very itchy.  She has been trying not to scratch at the lesions.  Denies any associated pain.  Denies any fevers, chills, myalgias or joint pain.  Has been applying OTC topical steroids with no improvement.  Has been keeping her skin covered when out in the sun.  Has had this ongoing problem for many years.  Does have a pending follow-up with her PCP next week.    Review of Systems   Constitutional: Negative for chills, diaphoresis, fever, malaise/fatigue and weight loss.   Respiratory: Negative for cough.    Cardiovascular: Negative for chest pain and palpitations.   Gastrointestinal: Negative for abdominal pain, nausea and vomiting.   Musculoskeletal: Negative for joint pain and myalgias.   Skin: Positive for itching and rash.   Neurological: Negative for dizziness, tingling and headaches.       Medications:    • cyclobenzaprine Tabs  • docusate sodium Caps  • fluconazole  • folic acid Tabs  • gabapentin Caps  • hydrocortisone Crea  • hydrOXYzine HCl Tabs  • hydrOXYzine pamoate Caps  • methylPREDNISolone Tbpk  • naproxen Tabs  • oxyCODONE-acetaminophen Tabs  • polyethylene glycol 3350 Powd  • traMADol Tabs  • Ventolin HFA Aers    Allergies: Erythromycin, Tape, and Tetanus toxoid    Problem List: Hui Brian does not have any pertinent problems on file.    Surgical History:  Past Surgical History:   Procedure Laterality Date   • BOUCHRA BY LAPAROSCOPY N/A 3/29/2019    Procedure: CHOLECYSTECTOMY, LAPAROSCOPIC;   "Surgeon: Astrid Enamorado M.D.;  Location: SURGERY SAME DAY [facility];  Service: General   • OTHER  1998    deviated nasal septum   • OTHER  1992    face recontruction due to dog attack       Past Social Hx: Hui Brian  reports that she has been smoking cigarettes. She has a 10.00 pack-year smoking history. She has never used smokeless tobacco. She reports current alcohol use. She reports that she does not use drugs.     Past Family Hx:  Hui Brian family history includes Psychiatric Illness in her mother.     Problem list, medications, and allergies reviewed by myself today in Epic.     Objective:     /82   Pulse 75   Temp 36.3 °C (97.4 °F) (Temporal)   Resp 16   Ht 1.702 m (5' 7\")   Wt 70.3 kg (155 lb)   LMP 12/03/2009   SpO2 99%   BMI 24.28 kg/m²     Physical Exam  Constitutional:       General: She is not in acute distress.     Appearance: Normal appearance. She is not ill-appearing, toxic-appearing or diaphoretic.   HENT:      Head: Normocephalic and atraumatic.      Right Ear: Tympanic membrane, ear canal and external ear normal.      Left Ear: Tympanic membrane, ear canal and external ear normal.      Nose: Nose normal. No congestion or rhinorrhea.      Mouth/Throat:      Mouth: Mucous membranes are moist.      Pharynx: No oropharyngeal exudate or posterior oropharyngeal erythema.   Eyes:      Conjunctiva/sclera: Conjunctivae normal.   Cardiovascular:      Rate and Rhythm: Normal rate and regular rhythm.      Pulses: Normal pulses.      Heart sounds: Normal heart sounds.   Pulmonary:      Effort: Pulmonary effort is normal.      Breath sounds: Normal breath sounds. No wheezing, rhonchi or rales.   Musculoskeletal:      Cervical back: Normal range of motion. No muscular tenderness.   Lymphadenopathy:      Cervical: No cervical adenopathy.   Skin:     General: Skin is warm and dry.      Capillary Refill: Capillary refill takes less than 2 seconds.      Findings: " Erythema and rash present.      Comments: Bilateral forearms contain an erythematous papular and scabbing rash.  No purulent discharge or drainage.  No tenderness to palpation.  No ascending redness or streaking.   Neurological:      General: No focal deficit present.      Mental Status: She is alert and oriented to person, place, and time. Mental status is at baseline.   Psychiatric:         Mood and Affect: Mood normal.         Thought Content: Thought content normal.           Assessment/Plan:     Comments/MDM:     • Take oral Medrol as prescribed.  • Topical hydrocortisone cream twice daily for 7 days.  • Avoid sun exposure.  • Follow-up with PCP.  • Avoid scratching to avoid secondary bacterial infection.  Call with questions or concerns.     Diagnosis and associated orders:     1. Polymorphous light eruption  methylPREDNISolone (MEDROL DOSEPAK) 4 MG Tablet Therapy Pack    hydrocortisone 2.5 % Cream topical cream            Differential diagnosis, natural history, supportive care, and indications for immediate follow-up discussed.    Advised the patient to follow-up with the primary care physician for recheck, reevaluation, and consideration of further management.    Please note that this dictation was created using voice recognition software. I have made reasonable attempt to correct obvious errors, but I expect that there are errors of grammar and possibly content that I did not discover before finalizing the note.    This note was electronically signed by ANJALI Huddleston PA-C

## 2022-01-20 ENCOUNTER — HOSPITAL ENCOUNTER (OUTPATIENT)
Facility: MEDICAL CENTER | Age: 57
End: 2022-01-20
Attending: INTERNAL MEDICINE
Payer: MEDICAID

## 2022-01-20 LAB — PATHOLOGY CONSULT NOTE: NORMAL

## 2022-01-20 PROCEDURE — 88305 TISSUE EXAM BY PATHOLOGIST: CPT

## 2022-02-10 ENCOUNTER — HOSPITAL ENCOUNTER (OUTPATIENT)
Facility: MEDICAL CENTER | Age: 57
End: 2022-02-10
Attending: INTERNAL MEDICINE
Payer: MEDICAID

## 2022-02-10 LAB — PATHOLOGY CONSULT NOTE: NORMAL

## 2022-02-10 PROCEDURE — 88305 TISSUE EXAM BY PATHOLOGIST: CPT

## 2022-05-18 ENCOUNTER — OFFICE VISIT (OUTPATIENT)
Dept: INTERNAL MEDICINE | Facility: OTHER | Age: 57
End: 2022-05-18
Payer: MEDICAID

## 2022-05-18 VITALS
OXYGEN SATURATION: 97 % | HEART RATE: 96 BPM | WEIGHT: 174.8 LBS | HEIGHT: 67 IN | SYSTOLIC BLOOD PRESSURE: 137 MMHG | BODY MASS INDEX: 27.44 KG/M2 | DIASTOLIC BLOOD PRESSURE: 89 MMHG | TEMPERATURE: 98.6 F

## 2022-05-18 DIAGNOSIS — L20.9 ATOPIC DERMATITIS, UNSPECIFIED TYPE: ICD-10-CM

## 2022-05-18 DIAGNOSIS — L29.9 PRURITUS: ICD-10-CM

## 2022-05-18 DIAGNOSIS — G47.00 INSOMNIA, UNSPECIFIED TYPE: ICD-10-CM

## 2022-05-18 DIAGNOSIS — Z00.00 ROUTINE ADULT HEALTH MAINTENANCE: ICD-10-CM

## 2022-05-18 DIAGNOSIS — J30.2 SEASONAL ALLERGIES: ICD-10-CM

## 2022-05-18 DIAGNOSIS — Z12.11 COLON CANCER SCREENING: ICD-10-CM

## 2022-05-18 DIAGNOSIS — Z12.31 BREAST CANCER SCREENING BY MAMMOGRAM: ICD-10-CM

## 2022-05-18 PROCEDURE — 99204 OFFICE O/P NEW MOD 45 MIN: CPT | Mod: GC | Performed by: STUDENT IN AN ORGANIZED HEALTH CARE EDUCATION/TRAINING PROGRAM

## 2022-05-18 RX ORDER — HYDROXYZINE HYDROCHLORIDE 25 MG/1
25 TABLET, FILM COATED ORAL 3 TIMES DAILY PRN
Qty: 60 TABLET | Refills: 1 | Status: SHIPPED | OUTPATIENT
Start: 2022-05-18 | End: 2022-06-16 | Stop reason: SDUPTHER

## 2022-05-18 RX ORDER — OXCARBAZEPINE 150 MG/1
TABLET, FILM COATED ORAL
COMMUNITY
Start: 2022-05-03 | End: 2022-05-18

## 2022-05-18 RX ORDER — MEMANTINE HYDROCHLORIDE 5 MG/1
5 TABLET ORAL 2 TIMES DAILY
COMMUNITY
Start: 2022-04-26

## 2022-05-18 RX ORDER — CETIRIZINE HYDROCHLORIDE 10 MG/1
10 TABLET ORAL DAILY
Qty: 90 TABLET | Refills: 3 | Status: SHIPPED | OUTPATIENT
Start: 2022-05-18 | End: 2022-06-16 | Stop reason: SDUPTHER

## 2022-05-18 RX ORDER — OXCARBAZEPINE 300 MG/1
TABLET, FILM COATED ORAL
COMMUNITY
Start: 2022-04-28

## 2022-05-18 RX ORDER — PREDNISONE 20 MG/1
TABLET ORAL
COMMUNITY
Start: 2022-05-08 | End: 2022-05-18

## 2022-05-18 ASSESSMENT — ENCOUNTER SYMPTOMS
PALPITATIONS: 0
VOMITING: 0
BLURRED VISION: 0
DIZZINESS: 0
SPEECH CHANGE: 0
WEIGHT LOSS: 0
INSOMNIA: 1
ORTHOPNEA: 0
SINUS PAIN: 0
PHOTOPHOBIA: 0
BRUISES/BLEEDS EASILY: 0
NECK PAIN: 0
HEARTBURN: 0
DEPRESSION: 0
ABDOMINAL PAIN: 0
TINGLING: 0
NAUSEA: 0
MYALGIAS: 0
HEADACHES: 0
DOUBLE VISION: 0
HALLUCINATIONS: 0
FOCAL WEAKNESS: 0
COUGH: 0
BACK PAIN: 0
SHORTNESS OF BREATH: 0
SENSORY CHANGE: 0
SORE THROAT: 0
CHILLS: 0
FEVER: 0
SPUTUM PRODUCTION: 0
HEMOPTYSIS: 0

## 2022-05-18 ASSESSMENT — LIFESTYLE VARIABLES: SUBSTANCE_ABUSE: 0

## 2022-05-18 ASSESSMENT — PATIENT HEALTH QUESTIONNAIRE - PHQ9: CLINICAL INTERPRETATION OF PHQ2 SCORE: 0

## 2022-05-18 NOTE — PROGRESS NOTES
New Patient    Hui Brian is a 56 y.o. female who presents today with the following:    CC: Establish Care with Primary Care Physician     HPI:    Mrs. Brian is a 57 y/o female coming to our clinic to establish care. She has a past medical history of Seasonal allergies, atopic dermatitis seen by allergologist and dermatologist.  Current chief complaint today is Pruritus for the past 8-9 months, unclear cause but likely secondary to unknown allergen. Patient has underwent extensive testing with allergologist and also has had multiple skin biopsies.  Patient usually gets generalized pruritus with exception of face and chest.  She states that has not had any recent lab work.  Patient is due for colonoscopy, mammogram and Pap smear, she is not interested in getting more Pap smears, but would like referrals for mammogram and colonoscopy.    ROS:       Review of Systems   Constitutional: Negative for chills, fever, malaise/fatigue and weight loss.   HENT: Negative for congestion, nosebleeds, sinus pain, sore throat and tinnitus.    Eyes: Negative for blurred vision, double vision and photophobia.   Respiratory: Negative for cough, hemoptysis, sputum production and shortness of breath.    Cardiovascular: Negative for chest pain, palpitations, orthopnea and leg swelling.   Gastrointestinal: Negative for abdominal pain, heartburn, nausea and vomiting.   Genitourinary: Negative for dysuria, frequency, hematuria and urgency.   Musculoskeletal: Negative for back pain, joint pain, myalgias and neck pain.   Skin: Positive for itching and rash.   Neurological: Negative for dizziness, tingling, sensory change, speech change, focal weakness and headaches.   Endo/Heme/Allergies: Does not bruise/bleed easily.   Psychiatric/Behavioral: Negative for depression, hallucinations, substance abuse and suicidal ideas. The patient has insomnia.          Past Medical History:   Diagnosis Date   • ALCOHOL ABUSE, IN REMISSION   "  • ASTHMA     prn inhaler   • Dental disorder     full dentures   • Depression     PTSD/anxiety   • Indigestion    • Pain     hands/knees       Past Surgical History:   Procedure Laterality Date   • BOUCHRA BY LAPAROSCOPY N/A 3/29/2019    Procedure: CHOLECYSTECTOMY, LAPAROSCOPIC;  Surgeon: Astrid Enamorado M.D.;  Location: SURGERY SAME DAY [facility];  Service: General   • OTHER  1998    deviated nasal septum   • OTHER  1992    face recontruction due to dog attack       Family History   Problem Relation Age of Onset   • Psychiatric Illness Mother        Social History     Tobacco Use   • Smoking status: Current Every Day Smoker     Packs/day: 0.50     Years: 20.00     Pack years: 10.00     Types: Cigarettes   • Smokeless tobacco: Never Used   Substance Use Topics   • Alcohol use: Yes     Comment: hx etoh abuse   • Drug use: No       Current Outpatient Medications   Medication Sig Dispense Refill   • memantine (NAMENDA) 5 MG Tab Take 5 mg by mouth 2 times a day.     • OXcarbazepine (TRILEPTAL) 300 MG Tab TAKE 1 TABLET BY MOUTH TWICE DAILY FOR NERVE PAIN RELIEF     • NS SOLN 60 mL with albuterol 2.5 mg/0.5 mL NEBU 100 mg Take  by nebulization.     • cetirizine (ZYRTEC) 10 MG Tab Take 1 Tablet by mouth every day. 90 Tablet 3   • hydrOXYzine HCl (ATARAX) 25 MG Tab Take 1 Tablet by mouth 3 times a day as needed for Itching. 60 Tablet 1   • hydrOXYzine pamoate (VISTARIL) 50 MG Cap Take 100 mg by mouth.  2   • cyclobenzaprine (FLEXERIL) 10 MG Tab Take 10 mg by mouth 3 times a day.       No current facility-administered medications for this visit.       Physical Exam:  /89 (BP Location: Right arm, Patient Position: Sitting, BP Cuff Size: Adult)   Pulse 96   Temp 37 °C (98.6 °F) (Temporal)   Ht 1.702 m (5' 7\")   Wt 79.3 kg (174 lb 12.8 oz)   LMP 12/03/2009   SpO2 97%   BMI 27.38 kg/m²      General: Well-developed, well-nourished female in no distress  HEENT: Conjuntiva and lids are within normal limits.  " External auditory canals are within normal limits.  Tympanic membranes are clear with a normal light refflex.  Nasal mucosa is normal.  Oral pharynx is normal and free of exudate.  Neck: Supple, non-tender with nothyromegaly noted.  Lympatic: Pre and Post auricular, sub-mandibular, anterior and posterior cervical and supraclavicular areas are without notable lymphadenopathy  Lungs: Clear to auscultation and perucssion bilaterally with good respiratory effort.  No wheezes, crackes or rhonci are heard.  Heart: Normal rate, regular rhythm with no murmurs, rubs or gallops heard.  Abdomen: Soft, non-tender, non-distended with normal-active bowel sounds.  Nor organomegaly noted.  Extremites: No edema, upper and lower extremities with evidence of intense scratching (multiple well healed scabs), no signs of infection.  Psych: Patient is awake, alert and oriented with recent and remote memory intact. Mood and affect are normal.       Assessment and Plan:     1. Atopic dermatitis, unspecified type  - Extensive testing in the past by Allergologist  - Patient with on and off dermatitis, most recently was given steroids just finished treatment 1 day ago,.  - Recommending Cera Ve lotion  - Avoid daily showering unless needed  - Follow up in 3-4 weeks    2. Seasonal allergies  - Known allergies since young age  - Recommending start Zirtec  - Extensively tested by Allergologist in the past    3. Pruritus  - Non identified allergen   - Seen by allergologist and dermatologist   - Recommended Atarex PRN    4. Routine adult health maintenance  Ordered basic lab work    Also ordered Colonoscopy and mammogram referrals             Problem List Items Addressed This Visit    None     Visit Diagnoses     Atopic dermatitis, unspecified type        Seasonal allergies        Relevant Medications    hydrOXYzine HCl (ATARAX) 25 MG Tab    Pruritus        Relevant Medications    cetirizine (ZYRTEC) 10 MG Tab    Routine adult health maintenance         Relevant Orders    CBC WITH DIFFERENTIAL    Comp Metabolic Panel    Lipid Profile    HEMOGLOBIN A1C          Return in about 4 weeks (around 6/15/2022).    Patient Instructions   - Atarax TID PRN  - Zyrtec daily  - Cera Ve lotion  - Lab work  - Follow up in 3-4 weeks      Signed by: Paco Hughes M.D.      Dr. Saul M.D. PGY-2  [facility]

## 2022-05-24 ENCOUNTER — TELEPHONE (OUTPATIENT)
Dept: INTERNAL MEDICINE | Facility: OTHER | Age: 57
End: 2022-05-24
Payer: MEDICAID

## 2022-05-24 NOTE — TELEPHONE ENCOUNTER
DOCUMENTATION OF PAR STATUS:    1. Name of Medication & Dose: Cetirizine 10 mg tab sig one daily     2. Name of Prescription Coverage Company & phone #: [employer] phone [phone] and fax [phone]    3. Date Prior Auth Submitted: 05/24/2022    4. What information was given to obtain insurance decision? Notes-pharmacy-prior auth form    5. Prior Auth Letter  Denied    6. Action Taken: Pharmacy/Patient Notified: Yes.. I called patient lv with rx status.

## 2022-06-15 ASSESSMENT — ENCOUNTER SYMPTOMS
WHEEZING: 0
SORE THROAT: 0
NAUSEA: 0
HEADACHES: 0
SHORTNESS OF BREATH: 0
VOMITING: 0
NERVOUS/ANXIOUS: 0
COUGH: 0
HEARTBURN: 0
FEVER: 0
PALPITATIONS: 0
DIARRHEA: 0
WEAKNESS: 0
ABDOMINAL PAIN: 0
FALLS: 0
CONSTIPATION: 0
BLURRED VISION: 0
CHILLS: 0
DIZZINESS: 0
DOUBLE VISION: 0
MYALGIAS: 0
DEPRESSION: 0
WEIGHT LOSS: 0

## 2022-06-15 ASSESSMENT — LIFESTYLE VARIABLES: SUBSTANCE_ABUSE: 0

## 2022-06-15 NOTE — PROGRESS NOTES
Established Patient    Hui presents today with the following:    CC: follow up    HPI: 56 year old female, presents for a  Follow up. PCP is Dr. Hughes.    Seen 5/18/22 to establish care with Dr. Hughes.Treated for atopic dermatitis with recommended Cera Ve lotion and seasonal allergies with Zyrtec. Has been tested extensively by an Allergist in [place], [facility], most recently last week. Rash improved on arms and legs, and resolved on back and abdomen. Atarax and Zyrtec is working well, taking Zyrtec daily and Atarax 3 times daily. Cera Ve lotion working well. Patient reports feeling much better.           Patient Active Problem List    Diagnosis Date Noted   • Other atopic dermatitis 06/16/2022   • Seasonal allergies 06/16/2022   • Pruritic rash 06/16/2022   • Depression    • ALCOHOL ABUSE, IN REMISSION        Social History     Tobacco Use   • Smoking status: Current Every Day Smoker     Packs/day: 0.50     Years: 20.00     Pack years: 10.00     Types: Cigarettes   • Smokeless tobacco: Never Used   Vaping Use   • Vaping Use: Never used   Substance Use Topics   • Alcohol use: Not Currently     Comment: hx etoh abuse   • Drug use: No       Current Outpatient Medications   Medication Sig Dispense Refill   • ipratropium (ATROVENT) 0.03 % Solution USE 1 SPRAY(S) IN EACH NOSTRIL THREE TIMES DAILY AS NEEDED FOR NASAL DRIP OR SNEEZE     • naproxen (NAPROSYN) 500 MG Tab TAKE 1 TABLET BY MOUTH EVERY 12 HOURS WITH FOOD AS NEEDED     • hydrOXYzine HCl (ATARAX) 25 MG Tab Take 1 Tablet by mouth 3 times a day as needed for Itching for up to 30 days. 90 Tablet 1   • cetirizine (ZYRTEC) 10 MG Tab Take 1 Tablet by mouth every day. 90 Tablet 3   • memantine (NAMENDA) 5 MG Tab Take 5 mg by mouth 2 times a day.     • OXcarbazepine (TRILEPTAL) 300 MG Tab TAKE 1 TABLET BY MOUTH TWICE DAILY FOR NERVE PAIN RELIEF     • NS SOLN 60 mL with albuterol 2.5 mg/0.5 mL NEBU 100 mg Take  by nebulization.     • hydrOXYzine pamoate  "(VISTARIL) 50 MG Cap Take 100 mg by mouth.  2   • cyclobenzaprine (FLEXERIL) 10 MG Tab Take 10 mg by mouth 3 times a day.       No current facility-administered medications for this visit.       Review of Systems   Constitutional: Negative for chills, fever, malaise/fatigue and weight loss.   HENT: Negative for congestion and sore throat.    Eyes: Negative for blurred vision and double vision.   Respiratory: Negative for cough, shortness of breath and wheezing.    Cardiovascular: Negative for chest pain and palpitations.   Gastrointestinal: Negative for abdominal pain, constipation, diarrhea, heartburn, nausea and vomiting.   Genitourinary: Negative for dysuria, frequency and urgency.   Musculoskeletal: Negative for falls, joint pain and myalgias.   Skin: Positive for rash. Negative for itching.   Neurological: Negative for dizziness, weakness and headaches.   Psychiatric/Behavioral: Negative for depression, substance abuse and suicidal ideas. The patient is not nervous/anxious.          /64 (BP Location: Left arm, Patient Position: Sitting, BP Cuff Size: Adult)   Pulse 73   Temp 37.3 °C (99.1 °F) (Temporal)   Ht 1.702 m (5' 7\")   Wt 78.6 kg (173 lb 3.2 oz)   LMP 12/03/2009   SpO2 94%   BMI 27.13 kg/m²       PHYSICAL EXAM:  Physical Exam  Vitals and nursing note reviewed.   Constitutional:       General: She is not in acute distress.     Appearance: Normal appearance. She is not ill-appearing.   HENT:      Head: Normocephalic and atraumatic.      Right Ear: External ear normal.      Left Ear: External ear normal.   Eyes:      Conjunctiva/sclera: Conjunctivae normal.   Skin:     General: Skin is warm and dry.      Capillary Refill: Capillary refill takes less than 2 seconds.      Coloration: Skin is not jaundiced or pale.      Findings: Erythema and rash present. No bruising.      Comments: Scattered dry, non-scaling and minimal erythematous papules on bilateral upper and lower extremities without " excoriation, warmth, vesicles, wheals, fluctuance, discharge appreciated.   Neurological:      Mental Status: She is alert and oriented to person, place, and time. Mental status is at baseline.   Psychiatric:         Mood and Affect: Mood normal.         Behavior: Behavior normal.         Note: I have reviewed all pertinent labs and diagnostic tests associated with this visit with specific comments listed under the assessment and plan below    Assessment and Plan    1. Other atopic dermatitis  -Improved it appears about 90% from pictures shown before treatment with Atarax, Zyrtec and Cera Ve lotion.  -continue Atarax for now, but patient instructed to wean off in conjunction with allergist which the patient reports she will contact in the next few weeks  -follow up 7/11/22 with another clinic provider. Unable to schedule with Dr. Hughes her PCP since his next availability is in August 2022.    2. Seasonal allergies  -continue Zyrtec, refilled today    3. Pruritus   -wean Atarax. Refilled today      Followup: Return in about 25 days (around 7/11/2022).      Signed by: Ray West Jr., M.D.

## 2022-06-16 ENCOUNTER — OFFICE VISIT (OUTPATIENT)
Dept: INTERNAL MEDICINE | Facility: OTHER | Age: 57
End: 2022-06-16
Payer: MEDICAID

## 2022-06-16 VITALS
HEART RATE: 73 BPM | HEIGHT: 67 IN | DIASTOLIC BLOOD PRESSURE: 64 MMHG | BODY MASS INDEX: 27.18 KG/M2 | TEMPERATURE: 99.1 F | OXYGEN SATURATION: 94 % | SYSTOLIC BLOOD PRESSURE: 114 MMHG | WEIGHT: 173.2 LBS

## 2022-06-16 DIAGNOSIS — J30.2 SEASONAL ALLERGIES: ICD-10-CM

## 2022-06-16 DIAGNOSIS — L29.9 PRURITUS: ICD-10-CM

## 2022-06-16 DIAGNOSIS — L20.89 OTHER ATOPIC DERMATITIS: ICD-10-CM

## 2022-06-16 PROBLEM — B96.89 BACTERIAL SKIN INFECTION: Status: RESOLVED | Noted: 2019-04-19 | Resolved: 2022-06-16

## 2022-06-16 PROBLEM — L08.9 BACTERIAL SKIN INFECTION: Status: RESOLVED | Noted: 2019-04-19 | Resolved: 2022-06-16

## 2022-06-16 PROBLEM — L28.2 PRURITIC RASH: Status: ACTIVE | Noted: 2022-06-16

## 2022-06-16 PROCEDURE — 99213 OFFICE O/P EST LOW 20 MIN: CPT | Mod: GC | Performed by: GENERAL PRACTICE

## 2022-06-16 RX ORDER — IPRATROPIUM BROMIDE 21 UG/1
SPRAY, METERED NASAL
COMMUNITY
Start: 2022-06-07 | End: 2023-10-02

## 2022-06-16 RX ORDER — CETIRIZINE HYDROCHLORIDE 10 MG/1
10 TABLET ORAL DAILY
Qty: 90 TABLET | Refills: 3 | Status: SHIPPED | OUTPATIENT
Start: 2022-06-16 | End: 2022-06-21

## 2022-06-16 RX ORDER — NAPROXEN 500 MG/1
TABLET ORAL
COMMUNITY
Start: 2022-05-31 | End: 2022-09-27 | Stop reason: SDUPTHER

## 2022-06-16 RX ORDER — HYDROXYZINE HYDROCHLORIDE 25 MG/1
25 TABLET, FILM COATED ORAL 3 TIMES DAILY PRN
Qty: 90 TABLET | Refills: 1 | Status: SHIPPED | OUTPATIENT
Start: 2022-06-16 | End: 2022-07-16

## 2022-06-16 RX ORDER — CETIRIZINE HYDROCHLORIDE 10 MG/1
10 TABLET ORAL
COMMUNITY
Start: 2022-05-18 | End: 2022-06-16

## 2022-06-16 NOTE — PATIENT INSTRUCTIONS
-refilled Atarax and Zyrtec  -take Zyrtec daily. Contact Allergist and wean off Atarax according to their recommendations  -continue Cera Ve lotion  -read through handout on atopic dermatitis  -avoid hot showers with lotion for atopic dermatitis  -follow Allergist treatment plan and recommendations  -will schedule follow up with Dr. Hughes next visit

## 2022-06-16 NOTE — PROGRESS NOTES
Teaching Physician Attestation      Level of Participation    I have personally interviewed and examined the patient.  In addition, I discussed with the resident physician the patient's history, exam, assessment and plan in detail.  Topics listed in my addendum were the focus of the visit.  Healthcare maintenance was not addressed this visit unless listed as a topic in my addendum.  I agree with the plan as written along with the following additions/modifications:      Eczema  -Dramatically improved compared to prior pictures, essentially almost resolved.  -Counseled to continue set of fill lotion daily, she is avoiding hot showers  -Patient states marijuana was sometimes triggering and she is now abstinent  -Continue Zyrtec daily for overall atopy, advise she should wean off of hydroxyzine soon if possible, will defer to allergy for further follow-up for this.    Return to clinic in 1 month to follow-up with PCP for other conditions.

## 2022-06-20 ENCOUNTER — TELEPHONE (OUTPATIENT)
Dept: INTERNAL MEDICINE | Facility: OTHER | Age: 57
End: 2022-06-20
Payer: MEDICAID

## 2022-06-20 DIAGNOSIS — J30.2 SEASONAL ALLERGIES: ICD-10-CM

## 2022-06-20 NOTE — TELEPHONE ENCOUNTER
DOCUMENTATION OF PAR STATUS:    1. Name of Medication & Dose: Zyrtec 10mg    2. Name of Prescription Coverage Company & phone #: [employer]    3. Date Prior Auth Submitted: 06/20/22    4. What information was given to obtain insurance decision? Icd-10 code    5. Prior Auth Status? Pending    6. Patient Notified: no

## 2022-06-21 RX ORDER — FEXOFENADINE HCL 180 MG/1
180 TABLET ORAL DAILY
Qty: 90 TABLET | Refills: 1 | Status: SHIPPED | OUTPATIENT
Start: 2022-06-21 | End: 2022-09-19

## 2022-06-21 NOTE — TELEPHONE ENCOUNTER
Denied zyrtec. Patient needs to try and fail over the counter loratidine, loratadine-D, and fexofenadine, fexofenadine-D first before patient's insurance approves to cover zyrtec

## 2022-07-13 RX ORDER — ALBUTEROL SULFATE 90 UG/1
AEROSOL, METERED RESPIRATORY (INHALATION)
COMMUNITY
Start: 2022-06-27 | End: 2022-09-27

## 2022-08-17 ENCOUNTER — TELEPHONE (OUTPATIENT)
Dept: INTERNAL MEDICINE | Facility: OTHER | Age: 57
End: 2022-08-17
Payer: MEDICAID

## 2022-08-17 NOTE — TELEPHONE ENCOUNTER
DOCUMENTATION OF PAR STATUS:    1. Name of Medication & Dose: Cetirizine 10mg     2. Name of Prescription Coverage Company & phone #: [employer]    3. Date Prior Auth Submitted: 08/17/22    4. What information was given to obtain insurance decision? ICD-10 code    5. Prior Auth Status? Pending    6. Patient Notified: no

## 2022-09-27 ENCOUNTER — OFFICE VISIT (OUTPATIENT)
Dept: INTERNAL MEDICINE | Facility: OTHER | Age: 57
End: 2022-09-27
Payer: MEDICAID

## 2022-09-27 VITALS
TEMPERATURE: 98.6 F | SYSTOLIC BLOOD PRESSURE: 136 MMHG | WEIGHT: 168 LBS | HEART RATE: 93 BPM | OXYGEN SATURATION: 96 % | HEIGHT: 67 IN | DIASTOLIC BLOOD PRESSURE: 80 MMHG | BODY MASS INDEX: 26.37 KG/M2

## 2022-09-27 DIAGNOSIS — L28.2 PRURITIC RASH: ICD-10-CM

## 2022-09-27 PROCEDURE — 99213 OFFICE O/P EST LOW 20 MIN: CPT | Mod: GE | Performed by: STUDENT IN AN ORGANIZED HEALTH CARE EDUCATION/TRAINING PROGRAM

## 2022-09-27 RX ORDER — ALBUTEROL SULFATE 90 UG/1
2 AEROSOL, METERED RESPIRATORY (INHALATION) EVERY 6 HOURS PRN
COMMUNITY

## 2022-09-27 RX ORDER — NAPROXEN 500 MG/1
500 TABLET ORAL 2 TIMES DAILY WITH MEALS
Qty: 90 TABLET | Refills: 3 | Status: SHIPPED | OUTPATIENT
Start: 2022-09-27 | End: 2023-06-06

## 2022-09-27 RX ORDER — HYDROXYZINE HYDROCHLORIDE 25 MG/1
25 TABLET, FILM COATED ORAL 2 TIMES DAILY
Qty: 90 TABLET | Refills: 1 | Status: SHIPPED | OUTPATIENT
Start: 2022-09-27 | End: 2022-11-10 | Stop reason: SDUPTHER

## 2022-09-27 RX ORDER — CETIRIZINE HYDROCHLORIDE 10 MG/1
10 TABLET ORAL DAILY
COMMUNITY
End: 2023-10-02

## 2022-09-27 RX ORDER — HYDROXYZINE PAMOATE 100 MG
100 CAPSULE ORAL 2 TIMES DAILY
Qty: 90 CAPSULE | Refills: 1 | Status: SHIPPED | OUTPATIENT
Start: 2022-09-27 | End: 2022-12-01

## 2022-09-27 RX ORDER — CYCLOBENZAPRINE HCL 10 MG
10 TABLET ORAL 3 TIMES DAILY
Qty: 90 TABLET | Refills: 2 | Status: SHIPPED | OUTPATIENT
Start: 2022-09-27 | End: 2023-10-02 | Stop reason: SDUPTHER

## 2022-09-27 NOTE — ASSESSMENT & PLAN NOTE
- Significantly improved since last minute - had added Zyrtec and Atarax at last appointment  - Pt requesting refill on all her medications, including these

## 2022-09-27 NOTE — PROGRESS NOTES
"Established Patient    Hui Brian is a 57 y.o. female who presents today with the following:    CC: Med refill    HPI:      Pt has no acute concerns or complaints - coming in today for refill on all of her meds. Pt declined flu shot today.       Patient Active Problem List    Diagnosis Date Noted    Other atopic dermatitis 06/16/2022    Seasonal allergies 06/16/2022    Pruritic rash 06/16/2022    Depression     ALCOHOL ABUSE, IN REMISSION        Social History     Tobacco Use    Smoking status: Every Day     Packs/day: 0.50     Years: 20.00     Pack years: 10.00     Types: Cigarettes    Smokeless tobacco: Never   Vaping Use    Vaping Use: Never used   Substance Use Topics    Alcohol use: Not Currently     Comment: hx etoh abuse    Drug use: No       Current Outpatient Medications   Medication Sig Dispense Refill    albuterol 108 (90 Base) MCG/ACT Aero Soln inhalation aerosol Inhale 2 Puffs every 6 hours as needed for Shortness of Breath.      cetirizine (ZYRTEC) 10 MG Tab Take 10 mg by mouth every day.      cyclobenzaprine (FLEXERIL) 10 mg Tab Take 1 Tablet by mouth 3 times a day. 90 Tablet 2    hydrOXYzine pamoate (VISTARIL) 100 MG Cap Take 1 Capsule by mouth 2 times a day. 90 Capsule 1    hydrOXYzine HCl (ATARAX) 25 MG Tab Take 1 Tablet by mouth 2 times a day. 90 Tablet 1    naproxen (NAPROSYN) 500 MG Tab Take 1 Tablet by mouth 2 times a day with meals. 90 Tablet 3    ipratropium (ATROVENT) 0.03 % Solution USE 1 SPRAY(S) IN EACH NOSTRIL THREE TIMES DAILY AS NEEDED FOR NASAL DRIP OR SNEEZE      memantine (NAMENDA) 5 MG Tab Take 5 mg by mouth 2 times a day.      OXcarbazepine (TRILEPTAL) 300 MG Tab TAKE 1 TABLET BY MOUTH TWICE DAILY FOR NERVE PAIN RELIEF       No current facility-administered medications for this visit.       /80 (BP Location: Left arm, Patient Position: Sitting, BP Cuff Size: Adult)   Pulse 93   Temp 37 °C (98.6 °F) (Temporal)   Ht 1.702 m (5' 7\")   Wt 76.2 kg (168 lb)   LMP " 12/03/2009   SpO2 96%   BMI 26.31 kg/m²     PHYSICAL EXAM:  Physical Exam  Vitals and nursing note reviewed.   Constitutional:       General: She is not in acute distress.     Appearance: Normal appearance. She is not ill-appearing.   HENT:      Head: Normocephalic and atraumatic.      Right Ear: External ear normal.      Left Ear: External ear normal.   Eyes:      Conjunctiva/sclera: Conjunctivae normal.   Cardiovascular:      Rate and Rhythm: Normal rate and regular rhythm.      Heart sounds: Normal heart sounds. No murmur heard.    No friction rub. No gallop.   Pulmonary:      Effort: Pulmonary effort is normal. No respiratory distress.      Breath sounds: Normal breath sounds. No wheezing or rales.   Skin:     General: Skin is warm and dry.      Capillary Refill: Capillary refill takes less than 2 seconds.      Findings: No erythema or rash.   Neurological:      Mental Status: She is alert and oriented to person, place, and time. Mental status is at baseline.   Psychiatric:         Mood and Affect: Mood normal.         Behavior: Behavior normal.         Assessment and Plan  Pruritic rash  - Significantly improved since last minute - had added Zyrtec and Atarax at last appointment  - Pt requesting refill on all her medications, including these      Return in about 6 months (around 3/27/2023) for Dr. Hughes.    Signed by: Vivian Alberto M.D.

## 2022-11-10 NOTE — TELEPHONE ENCOUNTER
Received request via: Patient    Was the patient seen in the last year in this department? Yes    Does the patient have an active prescription (recently filled or refills available) for medication(s) requested? No    Does the patient have group home Plus and need 100 day supply (blood pressure, diabetes and cholesterol meds only)? Patient does not have SCP

## 2022-11-11 RX ORDER — HYDROXYZINE HYDROCHLORIDE 25 MG/1
50 TABLET, FILM COATED ORAL 2 TIMES DAILY
Qty: 120 TABLET | Refills: 1 | Status: SHIPPED | OUTPATIENT
Start: 2022-11-11 | End: 2022-12-01

## 2022-11-11 NOTE — TELEPHONE ENCOUNTER
Patient inform prescription refilled by provider.    Pt reports that the roof of his mouth has an infection on it. Pt reports that he doesn't have tooth pain. Pt reports that he noticed yesterday. Pt denies any aleve and tylenol at 0300.

## 2022-11-17 ENCOUNTER — TELEPHONE (OUTPATIENT)
Dept: INTERNAL MEDICINE | Facility: OTHER | Age: 57
End: 2022-11-17
Payer: MEDICAID

## 2022-11-17 NOTE — TELEPHONE ENCOUNTER
Spoke to patient requesting correction on her medication Vistaril it needs to be written  for 250 mg bid #60. Please route to [facility] on file.

## 2022-11-18 ENCOUNTER — TELEPHONE (OUTPATIENT)
Dept: INTERNAL MEDICINE | Facility: OTHER | Age: 57
End: 2022-11-18
Payer: MEDICAID

## 2022-12-01 ENCOUNTER — TELEPHONE (OUTPATIENT)
Dept: INTERNAL MEDICINE | Facility: OTHER | Age: 57
End: 2022-12-01
Payer: MEDICAID

## 2022-12-01 DIAGNOSIS — L29.9 PRURITUS: ICD-10-CM

## 2022-12-01 RX ORDER — HYDROXYZINE PAMOATE 50 MG/1
50 CAPSULE ORAL 2 TIMES DAILY
Qty: 90 CAPSULE | Refills: 3 | Status: SHIPPED | OUTPATIENT
Start: 2022-12-01 | End: 2023-10-02 | Stop reason: SDUPTHER

## 2022-12-02 RX ORDER — HYDROXYZINE HYDROCHLORIDE 25 MG/1
25 TABLET, FILM COATED ORAL 3 TIMES DAILY PRN
Qty: 30 TABLET | Refills: 3 | Status: SHIPPED | OUTPATIENT
Start: 2022-12-02 | End: 2023-10-02

## 2023-10-02 ENCOUNTER — OFFICE VISIT (OUTPATIENT)
Dept: MEDICAL GROUP | Facility: LAB | Age: 58
End: 2023-10-02
Payer: COMMERCIAL

## 2023-10-02 VITALS
HEIGHT: 68 IN | WEIGHT: 164.9 LBS | SYSTOLIC BLOOD PRESSURE: 138 MMHG | DIASTOLIC BLOOD PRESSURE: 76 MMHG | HEART RATE: 88 BPM | OXYGEN SATURATION: 98 % | RESPIRATION RATE: 16 BRPM | TEMPERATURE: 97.7 F | BODY MASS INDEX: 24.99 KG/M2

## 2023-10-02 DIAGNOSIS — G89.4 CHRONIC PAIN SYNDROME: ICD-10-CM

## 2023-10-02 DIAGNOSIS — Z87.898 HISTORY OF ALCOHOL USE DISORDER: ICD-10-CM

## 2023-10-02 DIAGNOSIS — F41.9 ANXIETY: ICD-10-CM

## 2023-10-02 DIAGNOSIS — Z87.828: ICD-10-CM

## 2023-10-02 DIAGNOSIS — F32.A DEPRESSION, UNSPECIFIED DEPRESSION TYPE: ICD-10-CM

## 2023-10-02 DIAGNOSIS — L29.9 PRURITUS: ICD-10-CM

## 2023-10-02 PROBLEM — L28.2 PRURITIC RASH: Status: RESOLVED | Noted: 2022-06-16 | Resolved: 2023-10-02

## 2023-10-02 PROCEDURE — 99214 OFFICE O/P EST MOD 30 MIN: CPT | Performed by: STUDENT IN AN ORGANIZED HEALTH CARE EDUCATION/TRAINING PROGRAM

## 2023-10-02 PROCEDURE — 3078F DIAST BP <80 MM HG: CPT | Performed by: STUDENT IN AN ORGANIZED HEALTH CARE EDUCATION/TRAINING PROGRAM

## 2023-10-02 PROCEDURE — 3075F SYST BP GE 130 - 139MM HG: CPT | Performed by: STUDENT IN AN ORGANIZED HEALTH CARE EDUCATION/TRAINING PROGRAM

## 2023-10-02 RX ORDER — CYCLOBENZAPRINE HCL 10 MG
10 TABLET ORAL 3 TIMES DAILY PRN
Qty: 360 TABLET | Refills: 1 | Status: SHIPPED | OUTPATIENT
Start: 2023-10-02 | End: 2023-10-02

## 2023-10-02 RX ORDER — HYDROXYZINE PAMOATE 50 MG/1
100 CAPSULE ORAL 2 TIMES DAILY
Qty: 360 CAPSULE | Refills: 0 | Status: SHIPPED | OUTPATIENT
Start: 2023-10-02 | End: 2023-10-02

## 2023-10-02 RX ORDER — CYCLOBENZAPRINE HCL 10 MG
10 TABLET ORAL 3 TIMES DAILY PRN
Qty: 360 TABLET | Refills: 0 | Status: SHIPPED | OUTPATIENT
Start: 2023-10-02 | End: 2023-10-02

## 2023-10-02 RX ORDER — HYDROXYZINE PAMOATE 50 MG/1
100 CAPSULE ORAL 2 TIMES DAILY
Qty: 360 CAPSULE | Refills: 2 | Status: SHIPPED | OUTPATIENT
Start: 2023-10-02 | End: 2023-10-02

## 2023-10-02 RX ORDER — CYCLOBENZAPRINE HCL 10 MG
10 TABLET ORAL 3 TIMES DAILY PRN
Qty: 360 TABLET | Refills: 0 | Status: SHIPPED
Start: 2023-10-02 | End: 2023-12-29 | Stop reason: SDUPTHER

## 2023-10-02 RX ORDER — HYDROXYZINE PAMOATE 50 MG/1
100 CAPSULE ORAL 2 TIMES DAILY
Qty: 360 CAPSULE | Refills: 0 | Status: SHIPPED
Start: 2023-10-02 | End: 2023-12-29 | Stop reason: SDUPTHER

## 2023-10-02 ASSESSMENT — ENCOUNTER SYMPTOMS
CHILLS: 0
FEVER: 0
SHORTNESS OF BREATH: 0

## 2023-10-02 ASSESSMENT — PATIENT HEALTH QUESTIONNAIRE - PHQ9: CLINICAL INTERPRETATION OF PHQ2 SCORE: 0

## 2023-10-02 NOTE — PROGRESS NOTES
Subjective:     CC: establishing with new provider    HPI:   Hui presents today for the following;    Problem   Anxiety    Patient has been taking hydroxyzine 100mg BID for ten years      Chronic Pain Syndrome    Chronic pain of the low back and shoulders due to presumed arthritis. Patient refused any further imagings. She currently take cyclobenzaprine 10mg TID and naproxen and denies any side effects.     She also takes oxcarbazepine for nerve pain in her shoulders by neurology     History of Head Injury Without Fracture of Skull    When she was 12 she tripped in school and hit the back of her head. She did have loc. She was hospitalized for 2 weeks afterwards and reports she had clinical depression after     Depression    See has taken multitude of meds in the past which did not suit her. One her meds did make her have suicidal thoughts and she started drinking a lot of alcoholic. She denies si/hi.     History of Alcohol Use Disorder    2015 was her last drink   Drank heavily for roughly 20 years      Pruritic Rash (Resolved)       Current Outpatient Medications Ordered in Epic   Medication Sig Dispense Refill    cyclobenzaprine (FLEXERIL) 10 mg Tab Take 1 Tablet by mouth 3 times a day as needed for Moderate Pain. 360 Tablet 0    hydrOXYzine pamoate (VISTARIL) 50 MG Cap Take 2 Capsules by mouth 2 times a day. 360 Capsule 0    naproxen (NAPROSYN) 500 MG Tab TAKE 1 TABLET BY MOUTH TWICE A DAY WITH MEALS 60 Tablet 8    albuterol 108 (90 Base) MCG/ACT Aero Soln inhalation aerosol Inhale 2 Puffs every 6 hours as needed for Shortness of Breath.      memantine (NAMENDA) 5 MG Tab Take 5 mg by mouth 2 times a day.      OXcarbazepine (TRILEPTAL) 300 MG Tab TAKE 1 TABLET BY MOUTH TWICE DAILY FOR NERVE PAIN RELIEF       No current Epic-ordered facility-administered medications on file.           ROS:  Review of Systems   Constitutional:  Negative for chills and fever.   Respiratory:  Negative for shortness of breath.   "  Cardiovascular:  Negative for chest pain.       Objective:     Exam:  /76 (BP Location: Right arm, Patient Position: Sitting, BP Cuff Size: Adult)   Pulse 88   Temp 36.5 °C (97.7 °F)   Resp 16   Ht 1.727 m (5' 8\")   Wt 74.8 kg (164 lb 14.5 oz)   LMP 12/03/2009   SpO2 98%   BMI 25.07 kg/m²  Body mass index is 25.07 kg/m².    Physical Exam  Constitutional:       General: She is not in acute distress.     Appearance: She is not ill-appearing.   Pulmonary:      Effort: Pulmonary effort is normal.   Neurological:      Mental Status: She is alert.   Psychiatric:         Mood and Affect: Mood normal.         Behavior: Behavior normal.         Thought Content: Thought content normal.         Judgment: Judgment normal.               Assessment & Plan:     Problem List Items Addressed This Visit       Anxiety     Chronic  -continue hydroxyzine          Relevant Medications    hydrOXYzine pamoate (VISTARIL) 50 MG Cap    Chronic pain syndrome     Chronic  -continue cyclobenzaprine 10mg TID          Relevant Medications    cyclobenzaprine (FLEXERIL) 10 mg Tab    Depression    Relevant Medications    hydrOXYzine pamoate (VISTARIL) 50 MG Cap    History of alcohol use disorder    History of head injury without fracture of skull     Other Visit Diagnoses       Pruritus                    Please note that this dictation was created using voice recognition software. I have made every reasonable attempt to correct obvious errors, but I expect that there are errors of grammar and possibly content that I did not discover before finalizing the note.        "

## 2023-12-29 DIAGNOSIS — G89.4 CHRONIC PAIN SYNDROME: ICD-10-CM

## 2023-12-29 DIAGNOSIS — F41.9 ANXIETY: ICD-10-CM

## 2023-12-29 RX ORDER — CYCLOBENZAPRINE HCL 10 MG
10 TABLET ORAL 3 TIMES DAILY PRN
Qty: 360 TABLET | Refills: 0 | Status: SHIPPED | OUTPATIENT
Start: 2023-12-29

## 2023-12-29 RX ORDER — HYDROXYZINE PAMOATE 50 MG/1
100 CAPSULE ORAL 2 TIMES DAILY
Qty: 360 CAPSULE | Refills: 0 | Status: SHIPPED | OUTPATIENT
Start: 2023-12-29 | End: 2024-03-21

## 2023-12-29 NOTE — TELEPHONE ENCOUNTER
Received request via: Patient    Was the patient seen in the last year in this department? Yes    Does the patient have an active prescription (recently filled or refills available) for medication(s) requested? No    Does the patient have [facility] and need 100 day supply (blood pressure, diabetes and cholesterol meds only)? Patient does not have SCP   0

## 2024-03-20 DIAGNOSIS — F41.9 ANXIETY: ICD-10-CM

## 2024-03-21 RX ORDER — HYDROXYZINE PAMOATE 50 MG/1
100 CAPSULE ORAL 2 TIMES DAILY
Qty: 360 CAPSULE | Refills: 1 | Status: SHIPPED | OUTPATIENT
Start: 2024-03-21

## 2024-03-21 NOTE — TELEPHONE ENCOUNTER
Received request via: Pharmacy    Was the patient seen in the last year in this department? Yes  10/2/23  Does the patient have an active prescription (recently filled or refills available) for medication(s) requested? No    Pharmacy Name: [facility]    Does the patient have [facility] and need 100 day supply (blood pressure, diabetes and cholesterol meds only)? Medication is not for cholesterol, blood pressure or diabetes

## 2024-05-31 ENCOUNTER — NON-PROVIDER VISIT (OUTPATIENT)
Dept: URGENT CARE | Facility: PHYSICIAN GROUP | Age: 59
End: 2024-05-31

## 2024-05-31 DIAGNOSIS — Z02.1 PRE-EMPLOYMENT DRUG SCREENING: ICD-10-CM

## 2024-05-31 LAB
AMP AMPHETAMINE: NORMAL
COC COCAINE: NORMAL
INT CON NEG: NEGATIVE
INT CON POS: POSITIVE
MET METHAMPHETAMINES: NORMAL
OPI OPIATES: NORMAL
PCP PHENCYCLIDINE: NORMAL
POC DRUG COMMENT 753798-OCCUPATIONAL HEALTH: NEGATIVE
THC: NORMAL

## 2024-06-24 ENCOUNTER — OFFICE VISIT (OUTPATIENT)
Dept: URGENT CARE | Facility: PHYSICIAN GROUP | Age: 59
End: 2024-06-24
Payer: MEDICAID

## 2024-06-24 VITALS
OXYGEN SATURATION: 95 % | TEMPERATURE: 98 F | DIASTOLIC BLOOD PRESSURE: 84 MMHG | WEIGHT: 165.2 LBS | HEIGHT: 67 IN | SYSTOLIC BLOOD PRESSURE: 132 MMHG | BODY MASS INDEX: 25.93 KG/M2 | RESPIRATION RATE: 16 BRPM | HEART RATE: 82 BPM

## 2024-06-24 DIAGNOSIS — K59.03 CONSTIPATION DUE TO PAIN MEDICATION THERAPY: ICD-10-CM

## 2024-06-24 DIAGNOSIS — M54.2 CHRONIC NECK PAIN: ICD-10-CM

## 2024-06-24 DIAGNOSIS — G89.29 CHRONIC NECK PAIN: ICD-10-CM

## 2024-06-24 PROCEDURE — 99213 OFFICE O/P EST LOW 20 MIN: CPT

## 2024-06-24 PROCEDURE — 3075F SYST BP GE 130 - 139MM HG: CPT

## 2024-06-24 PROCEDURE — 3079F DIAST BP 80-89 MM HG: CPT

## 2024-06-24 RX ORDER — DICLOFENAC SODIUM 75 MG/1
75 TABLET, DELAYED RELEASE ORAL 2 TIMES DAILY
COMMUNITY

## 2024-06-24 RX ORDER — OXYCODONE HYDROCHLORIDE AND ACETAMINOPHEN 5; 325 MG/1; MG/1
1 TABLET ORAL 3 TIMES DAILY
COMMUNITY

## 2024-06-24 RX ORDER — NALDEMEDINE 0.2 MG/1
0.2 TABLET ORAL DAILY
COMMUNITY

## 2024-06-24 ASSESSMENT — ENCOUNTER SYMPTOMS
NECK PAIN: 1
FEVER: 0
NAUSEA: 0
CHILLS: 0
VOMITING: 0
ABDOMINAL PAIN: 0
DIARRHEA: 0
CONSTIPATION: 1

## 2024-06-24 NOTE — LETTER
June 24, 2024    To Whom It May Concern:         This is confirmation that Hui Brian attended her scheduled appointment with PAPITO Lay on 6/24/24. Please excuse her from work on 6/23/24 due to an acute illness.         If you have any questions please do not hesitate to call me at the phone number listed below.    Sincerely,          Vandana Hughes A.P.R.N.  [phone]

## 2024-06-24 NOTE — PROGRESS NOTES
Subjective:   Hui Brian is a very pleasant 58 y.o. female who presents for:    Chief Complaint   Patient presents with    Constipation     Pt states she has a pinched nerve in her neck causing intense pain through out her body. She has an appointment for a surgery for it. The prescribed Percocets have caused constipation.  She was also given a prescription for the constipation, but hasn't been effective each day. She was able to have a bowel movement this morning but it was small.     Neck Pain       HPI:    The patient presents to the urgent care for a work note.  She reports that yesterday, she developed constipation while at work.  She takes Symproic for narcotic induced constipation, which she found to the ineffective.  Unfortunately, while she was at work, she developed intermittent bouts of abdominal cramping, diarrhea, and nausea.  She was forced to leave work due to the severity of her symptoms.  Fortunately, her symptoms have resolved.she also reports a history of chronic neck pain.  At the time, she is having posterior neck tenderness, which she reports is not new.  Her neck pain is worse in the morning.  Denies radicular symptoms, no arm pain, weakness, headaches, trauma to the neck, fever, chills, recent infection, neck stiffness.  She has a consult with a surgeon on 6/26/24. Reports that she has been taking oxycodone-acetaminophen as prescribed by her pain management doctor.     ROS:    Review of Systems   Constitutional:  Negative for chills, fever and malaise/fatigue.   Gastrointestinal:  Positive for constipation. Negative for abdominal pain, diarrhea, nausea and vomiting.   Musculoskeletal:  Positive for neck pain.   All other systems reviewed and are negative.      Medications:      Current Outpatient Medications   Medication Sig    oxyCODONE-acetaminophen (PERCOCET) 5-325 MG Tab Take 1 Tablet by mouth 3 times a day.    diclofenac DR (VOLTAREN) 75 MG Tablet Delayed Response Take 75 mg  by mouth 2 times a day.    Naldemedine Tosylate (SYMPROIC) 0.2 MG Tab Take 0.2 mg by mouth every day.    hydrOXYzine pamoate (VISTARIL) 50 MG Cap TAKE 2 CAPSULES BY MOUTH TWICE A DAY    cyclobenzaprine (FLEXERIL) 10 mg Tab Take 1 Tablet by mouth 3 times a day as needed for Moderate Pain.    albuterol 108 (90 Base) MCG/ACT Aero Soln inhalation aerosol Inhale 2 Puffs every 6 hours as needed for Shortness of Breath.    memantine (NAMENDA) 5 MG Tab Take 5 mg by mouth 2 times a day.    OXcarbazepine (TRILEPTAL) 300 MG Tab TAKE 1 TABLET BY MOUTH TWICE DAILY FOR NERVE PAIN RELIEF    naproxen (NAPROSYN) 500 MG Tab TAKE 1 TABLET BY MOUTH TWICE A DAY WITH MEALS (Patient not taking: Reported on 6/24/2024)       Allergies:     Allergies   Allergen Reactions    Erythromycin Hives    Gabapentin      Pruritic rash    Tape Itching    Tetanus Toxoid Itching       Problem List:     Patient Active Problem List   Diagnosis    Depression    History of alcohol use disorder    Other atopic dermatitis    Seasonal allergies    Anxiety    Chronic pain syndrome    History of head injury without fracture of skull       Surgical History:    Past Surgical History:   Procedure Laterality Date    BOUCHRA BY LAPAROSCOPY N/A 3/29/2019    Procedure: CHOLECYSTECTOMY, LAPAROSCOPIC;  Surgeon: Astrid Enamorado M.D.;  Location: SURGERY SAME DAY [facility];  Service: General    OTHER  1998    deviated nasal septum    OTHER  1992    face recontruction due to dog attack       Past Social Hx:     Social History     Socioeconomic History    Marital status:     Number of children: 3   Occupational History    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     Employer: not employed   Tobacco Use    Smoking status: Every Day     Current packs/day: 0.50     Average packs/day: 0.5 packs/day for 20.0 years (10.0 ttl pk-yrs)     Types: Cigarettes    Smokeless tobacco: Never   Vaping Use    Vaping status: Never Used   Substance and Sexual  Activity    Alcohol use: Not Currently     Comment: hx etoh abuse    Drug use: No    Sexual activity: Not Currently     Partners: Male     Birth control/protection: Condom   Social History Narrative    One daughter  age 19 due to MVA        Past Family Hx:      Family History   Problem Relation Age of Onset    Psychiatric Illness Mother     Melanoma Maternal Grandmother        Problem list, medications, and allergies reviewed by myself today in Epic.     Objective:     Vitals:    24 1137   BP: 132/84   Pulse: 82   Resp: 16   Temp: 36.7 °C (98 °F)   SpO2: 95%       Physical Exam  Vitals reviewed.   Constitutional:       General: She is not in acute distress.     Appearance: Normal appearance. She is not ill-appearing, toxic-appearing or diaphoretic.   HENT:      Head: Normocephalic and atraumatic.      Right Ear: External ear normal.      Left Ear: External ear normal.      Nose: Nose normal.      Mouth/Throat:      Mouth: Mucous membranes are moist.   Eyes:      Extraocular Movements: Extraocular movements intact.      Conjunctiva/sclera: Conjunctivae normal.      Pupils: Pupils are equal, round, and reactive to light.   Pulmonary:      Effort: Pulmonary effort is normal.   Abdominal:      General: Abdomen is flat.      Palpations: Abdomen is soft.      Tenderness: There is no abdominal tenderness.   Musculoskeletal:         General: Normal range of motion.      Cervical back: Normal range of motion.      Comments: Neck exam deferred as patient reports that her pain has been unchanged   Skin:     General: Skin is warm and dry.   Neurological:      General: No focal deficit present.      Mental Status: She is alert and oriented to person, place, and time.   Psychiatric:         Mood and Affect: Mood normal.         Behavior: Behavior normal.         Thought Content: Thought content normal.                 Assessment/Plan:     Diagnosis and associated orders:     1. Chronic neck pain    2. Constipation due  to pain medication therapy    Other orders  - oxyCODONE-acetaminophen (PERCOCET) 5-325 MG Tab; Take 1 Tablet by mouth 3 times a day.  - diclofenac DR (VOLTAREN) 75 MG Tablet Delayed Response; Take 75 mg by mouth 2 times a day.  - Naldemedine Tosylate (SYMPROIC) 0.2 MG Tab; Take 0.2 mg by mouth every day.          Comments/MDM:     Patient to follow-up with neurosurgery on 6/26/2024 for chronic neck pain  No new symptoms at this time that would warrant further imaging or a change in her pain management regimen  Supportive measures discussed  Constipation resolved with the use of Symproic  Constipation management discussed, including increased fiber, water intake, regular exercise  Work note provided            All questions answered. Patient verbalized understanding and is in agreement with this plan of care.     If symptoms are worsening or not improving in 3-5 days, follow-up with PCP or return to UC. Differential diagnosis, natural history, and supportive care discussed. AVS handout given and reviewed with patient. Patient educated on red flags and when to seek treatment back in ED or UC.     I personally reviewed prior external notes and test results pertinent to today's visit.  I have independently reviewed and interpreted all diagnostics ordered during this urgent care visit.     This dictation has been created using voice recognition software. The accuracy of the dictation is limited by the abilities of the software. I expect there may be some errors of grammar and possibly content. I made every attempt to manually correct the errors within my dictation. However, errors related to voice recognition software may still exist and should be interpreted within the appropriate context.    This note was electronically signed by PAPITO [place]

## 2024-07-27 ENCOUNTER — OFFICE VISIT (OUTPATIENT)
Dept: URGENT CARE | Facility: PHYSICIAN GROUP | Age: 59
End: 2024-07-27
Payer: MEDICAID

## 2024-07-27 VITALS
HEIGHT: 67 IN | RESPIRATION RATE: 16 BRPM | OXYGEN SATURATION: 95 % | BODY MASS INDEX: 24.89 KG/M2 | DIASTOLIC BLOOD PRESSURE: 82 MMHG | SYSTOLIC BLOOD PRESSURE: 120 MMHG | HEART RATE: 88 BPM | TEMPERATURE: 97.7 F | WEIGHT: 158.6 LBS

## 2024-07-27 DIAGNOSIS — R11.2 NAUSEA AND VOMITING, UNSPECIFIED VOMITING TYPE: ICD-10-CM

## 2024-07-27 DIAGNOSIS — K59.00 CONSTIPATION, UNSPECIFIED CONSTIPATION TYPE: ICD-10-CM

## 2024-07-27 PROCEDURE — 99214 OFFICE O/P EST MOD 30 MIN: CPT | Performed by: NURSE PRACTITIONER

## 2024-07-27 PROCEDURE — 3074F SYST BP LT 130 MM HG: CPT | Performed by: NURSE PRACTITIONER

## 2024-07-27 PROCEDURE — 3079F DIAST BP 80-89 MM HG: CPT | Performed by: NURSE PRACTITIONER

## 2024-07-27 RX ORDER — ONDANSETRON 4 MG/1
4 TABLET, ORALLY DISINTEGRATING ORAL EVERY 8 HOURS PRN
Qty: 10 TABLET | Refills: 0 | Status: SHIPPED | OUTPATIENT
Start: 2024-07-27

## 2024-07-27 ASSESSMENT — ENCOUNTER SYMPTOMS
NAUSEA: 1
ROS GI COMMENTS: 1
VOMITING: 1
BLOOD IN STOOL: 0
CHILLS: 0
DIAPHORESIS: 0
CHANGE IN BOWEL HABIT: 1
CONSTIPATION: 1
FATIGUE: 1
DIARRHEA: 0
COUGH: 0
ABDOMINAL PAIN: 1

## 2024-08-21 ENCOUNTER — OFFICE VISIT (OUTPATIENT)
Dept: URGENT CARE | Facility: PHYSICIAN GROUP | Age: 59
End: 2024-08-21
Payer: MEDICAID

## 2024-08-21 VITALS
SYSTOLIC BLOOD PRESSURE: 138 MMHG | HEIGHT: 67 IN | DIASTOLIC BLOOD PRESSURE: 70 MMHG | RESPIRATION RATE: 20 BRPM | WEIGHT: 156.2 LBS | HEART RATE: 72 BPM | BODY MASS INDEX: 24.52 KG/M2 | TEMPERATURE: 97.2 F | OXYGEN SATURATION: 97 %

## 2024-08-21 DIAGNOSIS — R11.2 NAUSEA AND VOMITING IN ADULT: ICD-10-CM

## 2024-08-21 PROCEDURE — 3078F DIAST BP <80 MM HG: CPT | Performed by: NURSE PRACTITIONER

## 2024-08-21 PROCEDURE — 99214 OFFICE O/P EST MOD 30 MIN: CPT | Performed by: NURSE PRACTITIONER

## 2024-08-21 PROCEDURE — 3075F SYST BP GE 130 - 139MM HG: CPT | Performed by: NURSE PRACTITIONER

## 2024-08-21 RX ORDER — ONDANSETRON 4 MG/1
4 TABLET, ORALLY DISINTEGRATING ORAL EVERY 6 HOURS PRN
Qty: 20 TABLET | Refills: 0 | Status: SHIPPED | OUTPATIENT
Start: 2024-08-21 | End: 2024-08-26

## 2024-08-21 ASSESSMENT — ENCOUNTER SYMPTOMS
CHANGE IN BOWEL HABIT: 0
NAUSEA: 1
VOMITING: 1

## 2024-08-21 NOTE — PROGRESS NOTES
Subjective:     Hui Brian is a 58 y.o. female who presents for Nausea (Pt sts she was seen a few weeks ago for nausea and she was prescribed zofran, pt would like a refill as she can't see a PCP until next month )      Nausea  This is a recurrent (Pt was seen on 7/27/2024 for the same issue) problem. The current episode started 1 to 4 weeks ago (Hui is a pleasant 58 year old female who presents to  today with complaints of ongoing nausea and vomiting.). Associated symptoms include nausea and vomiting. Pertinent negatives include no change in bowel habit. Treatments tried: Zofran.   Last colonoscopy 8 years ago. No new medications.  She is on pain medication scheduled from pain management.  She states that she is working night shift and this has been very hard on her sleep schedule and body.  She is not sure if nausea is stemming from stress, illness or sleep schedule.      Review of Systems   Gastrointestinal:  Positive for nausea and vomiting. Negative for change in bowel habit.       PMH:   Past Medical History:   Diagnosis Date    ALCOHOL ABUSE, IN REMISSION     ASTHMA     prn inhaler    Dental disorder     full dentures    Depression     PTSD/anxiety    Indigestion     Pain     hands/knees     ALLERGIES:   Allergies   Allergen Reactions    Erythromycin Hives    Gabapentin      Pruritic rash    Lyrica [Pregabalin] Rash     Pruritic rash    Tape Itching    Tetanus Toxoid Itching     SURGHX:   Past Surgical History:   Procedure Laterality Date    BOUCHRA BY LAPAROSCOPY N/A 3/29/2019    Procedure: CHOLECYSTECTOMY, LAPAROSCOPIC;  Surgeon: Astrid Enamorado M.D.;  Location: SURGERY SAME DAY [facility];  Service: General    OTHER  1998    deviated nasal septum    OTHER  1992    face recontruction due to dog attack     SOCHX:   Social History     Socioeconomic History    Marital status:     Number of children: 3   Occupational History    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     Employer: not employed   Tobacco Use    Smoking status: Every Day     Current packs/day: 0.50     Average packs/day: 0.5 packs/day for 20.0 years (10.0 ttl pk-yrs)     Types: Cigarettes    Smokeless tobacco: Never   Vaping Use    Vaping status: Never Used   Substance and Sexual Activity    Alcohol use: Not Currently     Comment: hx etoh abuse    Drug use: No    Sexual activity: Not Currently     Partners: Male     Birth control/protection: Condom   Social History Narrative    One daughter  age 19 due to MVA     FH:   Family History   Problem Relation Age of Onset    Psychiatric Illness Mother     Melanoma Maternal Grandmother          Objective:   /70   Pulse 72   Temp 36.2 °C (97.2 °F) (Temporal)   Resp 20   Ht 1.702 m (5' 7\")   Wt 70.9 kg (156 lb 3.2 oz)   LMP 2009   SpO2 97%   BMI 24.46 kg/m²     Physical Exam  Vitals and nursing note reviewed.   Constitutional:       General: She is not in acute distress.     Appearance: Normal appearance. She is normal weight. She is not ill-appearing or toxic-appearing.   HENT:      Head: Normocephalic.      Right Ear: External ear normal.      Left Ear: External ear normal.      Nose: No congestion or rhinorrhea.      Mouth/Throat:      Pharynx: No oropharyngeal exudate or posterior oropharyngeal erythema.   Eyes:      General:         Right eye: No discharge.         Left eye: No discharge.      Pupils: Pupils are equal, round, and reactive to light.   Pulmonary:      Effort: Pulmonary effort is normal.   Abdominal:      General: Abdomen is flat. There is no distension.      Palpations: There is no mass.      Tenderness: There is no abdominal tenderness. There is no right CVA tenderness, left CVA tenderness, guarding or rebound.      Hernia: No hernia is present.   Musculoskeletal:         General: Normal range of motion.      Cervical back: Normal range of motion and neck supple.   Skin:     General: Skin is dry.   Neurological: "      General: No focal deficit present.      Mental Status: She is alert and oriented to person, place, and time. Mental status is at baseline.   Psychiatric:         Mood and Affect: Mood normal.         Behavior: Behavior normal.         Thought Content: Thought content normal.         Judgment: Judgment normal.       Assessment/Plan:   Assessment    1. Nausea and vomiting in adult  Referral to Gastroenterology    ondansetron (ZOFRAN ODT) 4 MG TABLET DISPERSIBLE    CBC WITH DIFFERENTIAL    Comp Metabolic Panel    LIPASE        Pt referred to follow-up with GI for further evaluation and treatment of ongoing nausea and vomiting.  Zofran was refilled as this does provide her relief.  CBC, CMP and lipase also ordered for evaluation of symptoms.  I will notify her of results.  Work note provided today.

## 2024-08-21 NOTE — LETTER
August 21, 2024    To Whom It May Concern:         This is confirmation that Hui Brian attended her scheduled appointment with APPITO Bhardwaj on 8/21/24. Please excuse her absence on 8/20/2024 due to an acute condition. She may return to work 8/21/2024.          If you have any questions please do not hesitate to call me at the phone number listed below.    Sincerely,          TINO Bhardwaj.  [phone]

## 2024-09-19 ENCOUNTER — APPOINTMENT (OUTPATIENT)
Dept: MEDICAL GROUP | Facility: CLINIC | Age: 59
End: 2024-09-19
Payer: MEDICAID

## 2024-09-19 VITALS
OXYGEN SATURATION: 96 % | BODY MASS INDEX: 24.08 KG/M2 | SYSTOLIC BLOOD PRESSURE: 130 MMHG | RESPIRATION RATE: 18 BRPM | HEIGHT: 67 IN | HEART RATE: 76 BPM | WEIGHT: 153.44 LBS | DIASTOLIC BLOOD PRESSURE: 64 MMHG

## 2024-09-19 ASSESSMENT — PATIENT HEALTH QUESTIONNAIRE - PHQ9: CLINICAL INTERPRETATION OF PHQ2 SCORE: 0

## 2024-10-08 ENCOUNTER — OFFICE VISIT (OUTPATIENT)
Dept: URGENT CARE | Facility: PHYSICIAN GROUP | Age: 59
End: 2024-10-08
Payer: MEDICAID

## 2024-10-08 VITALS
BODY MASS INDEX: 24.01 KG/M2 | DIASTOLIC BLOOD PRESSURE: 76 MMHG | HEIGHT: 67 IN | HEART RATE: 82 BPM | RESPIRATION RATE: 14 BRPM | SYSTOLIC BLOOD PRESSURE: 132 MMHG | WEIGHT: 153 LBS | TEMPERATURE: 97.8 F | OXYGEN SATURATION: 97 %

## 2024-10-08 DIAGNOSIS — R11.0 NAUSEA: ICD-10-CM

## 2024-10-08 PROCEDURE — 3075F SYST BP GE 130 - 139MM HG: CPT | Performed by: PHYSICIAN ASSISTANT

## 2024-10-08 PROCEDURE — 99213 OFFICE O/P EST LOW 20 MIN: CPT | Performed by: PHYSICIAN ASSISTANT

## 2024-10-08 PROCEDURE — 3078F DIAST BP <80 MM HG: CPT | Performed by: PHYSICIAN ASSISTANT

## 2024-10-08 RX ORDER — ONDANSETRON 4 MG/1
4 TABLET, ORALLY DISINTEGRATING ORAL EVERY 6 HOURS PRN
Qty: 20 TABLET | Refills: 0 | Status: SHIPPED | OUTPATIENT
Start: 2024-10-08

## 2024-10-08 ASSESSMENT — ENCOUNTER SYMPTOMS
FEVER: 0
CONSTIPATION: 1
DIAPHORESIS: 0
WEAKNESS: 0
HEARTBURN: 0
HEADACHES: 0
CHILLS: 0
NAUSEA: 1
MYALGIAS: 0
DIZZINESS: 0
VOMITING: 0
DIARRHEA: 0
FLANK PAIN: 0
ABDOMINAL PAIN: 0
BLOOD IN STOOL: 0

## 2024-10-29 ENCOUNTER — APPOINTMENT (OUTPATIENT)
Dept: MEDICAL GROUP | Facility: CLINIC | Age: 59
End: 2024-10-29
Payer: MEDICAID

## 2024-11-11 ENCOUNTER — OFFICE VISIT (OUTPATIENT)
Dept: URGENT CARE | Facility: PHYSICIAN GROUP | Age: 59
End: 2024-11-11
Payer: MEDICAID

## 2024-11-11 VITALS
OXYGEN SATURATION: 97 % | BODY MASS INDEX: 23.29 KG/M2 | TEMPERATURE: 98 F | RESPIRATION RATE: 14 BRPM | WEIGHT: 148.4 LBS | DIASTOLIC BLOOD PRESSURE: 76 MMHG | SYSTOLIC BLOOD PRESSURE: 124 MMHG | HEART RATE: 70 BPM | HEIGHT: 67 IN

## 2024-11-11 DIAGNOSIS — M54.2 CHRONIC NECK PAIN: ICD-10-CM

## 2024-11-11 DIAGNOSIS — G89.29 CHRONIC NECK PAIN: ICD-10-CM

## 2024-11-11 DIAGNOSIS — R11.2 NAUSEA AND VOMITING, UNSPECIFIED VOMITING TYPE: ICD-10-CM

## 2024-11-11 PROCEDURE — 99213 OFFICE O/P EST LOW 20 MIN: CPT | Performed by: NURSE PRACTITIONER

## 2024-11-11 PROCEDURE — 3078F DIAST BP <80 MM HG: CPT | Performed by: NURSE PRACTITIONER

## 2024-11-11 PROCEDURE — 3074F SYST BP LT 130 MM HG: CPT | Performed by: NURSE PRACTITIONER

## 2024-11-11 RX ORDER — ONDANSETRON 4 MG/1
4 TABLET, FILM COATED ORAL EVERY 4 HOURS PRN
Qty: 20 TABLET | Refills: 0 | Status: SHIPPED | OUTPATIENT
Start: 2024-11-11 | End: 2024-11-18

## 2024-11-11 NOTE — PROGRESS NOTES
Subjective:     Hui Brian is a 59 y.o. female who presents for Other (Pt states she is here for a Dr.s note. Pt left work early from work yesterday due to pain and needs a note for the early out. She also needs more Zofran. )      Other      Pt presents for evaluation of a new problem. Hui is a very pleasant 59-year-old female presents to urgent care today with the need for a doctor's note and a new prescription for Zofran.  She states that she has been suffering from chronic neck pain for the past year and is scheduled to have surgery.  Yesterday her pain was so severe that she had to leave work early.  She does use Zofran as her pain medications make her very nauseous and to cause vomiting.    ROS    PMH:   Past Medical History:   Diagnosis Date    ALCOHOL ABUSE, IN REMISSION     ASTHMA     prn inhaler    Dental disorder     full dentures    Depression     PTSD/anxiety    Indigestion     Pain     hands/knees     ALLERGIES:   Allergies   Allergen Reactions    Erythromycin Hives    Gabapentin      Pruritic rash    Lyrica [Pregabalin] Rash     Pruritic rash    Tape Itching    Tetanus Toxoid Itching     SURGHX:   Past Surgical History:   Procedure Laterality Date    BOUCHRA BY LAPAROSCOPY N/A 3/29/2019    Procedure: CHOLECYSTECTOMY, LAPAROSCOPIC;  Surgeon: Astrid Enamorado M.D.;  Location: SURGERY SAME DAY [facility];  Service: General    OTHER  1998    deviated nasal septum    OTHER  1992    face recontruction due to dog attack     SOCHX:   Social History     Socioeconomic History    Marital status:     Number of children: 3   Occupational History    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     Employer: not employed   Tobacco Use    Smoking status: Every Day     Current packs/day: 0.50     Average packs/day: 0.5 packs/day for 20.0 years (10.0 ttl pk-yrs)     Types: Cigarettes    Smokeless tobacco: Never   Vaping Use    Vaping status: Never Used   Substance and  "Sexual Activity    Alcohol use: Not Currently     Comment: hx etoh abuse    Drug use: Yes     Types: Marijuana, Inhaled    Sexual activity: Not Currently     Partners: Male     Birth control/protection: Condom   Social History Narrative    One daughter  age 19 due to MVA     FH:   Family History   Problem Relation Age of Onset    Psychiatric Illness Mother     Melanoma Maternal Grandmother          Objective:   /76 (BP Location: Right arm, Patient Position: Sitting, BP Cuff Size: Adult)   Pulse 70   Temp 36.7 °C (98 °F) (Temporal)   Resp 14   Ht 1.702 m (5' 7\")   Wt 67.3 kg (148 lb 6.4 oz)   LMP 2009   SpO2 97%   BMI 23.24 kg/m²     Physical Exam  Vitals and nursing note reviewed.   Constitutional:       General: She is not in acute distress.     Appearance: Normal appearance. She is normal weight. She is not ill-appearing or toxic-appearing.   HENT:      Head: Normocephalic.      Right Ear: External ear normal.      Left Ear: External ear normal.      Nose: No congestion or rhinorrhea.      Mouth/Throat:      Pharynx: No oropharyngeal exudate or posterior oropharyngeal erythema.   Eyes:      General:         Right eye: No discharge.         Left eye: No discharge.      Pupils: Pupils are equal, round, and reactive to light.   Pulmonary:      Effort: Pulmonary effort is normal.   Abdominal:      General: Abdomen is flat.   Musculoskeletal:      Cervical back: Neck supple. No erythema. Pain with movement and spinous process tenderness present. Decreased range of motion.   Skin:     General: Skin is dry.   Neurological:      General: No focal deficit present.      Mental Status: She is alert and oriented to person, place, and time. Mental status is at baseline.   Psychiatric:         Mood and Affect: Mood normal.         Behavior: Behavior normal.         Thought Content: Thought content normal.         Judgment: Judgment normal.         Assessment/Plan:   Assessment      1. Nausea and " vomiting, unspecified vomiting type  ondansetron (ZOFRAN) 4 MG Tab tablet      2. Chronic neck pain          Work note provided today.  She was also prescribed Zofran for further relief of nausea and vomiting.  She does follow-up with spine surgeon and physical therapy.  Patient to follow-up in urgent care or ER for worsening or persistent symptoms.

## 2024-11-11 NOTE — LETTER
November 11, 2024    To Whom It May Concern:         This is confirmation that Hui Correa Brian attended her scheduled appointment with PAPITO Bhardwaj on 11/11/24. Please excuse her absence on 11/10/2024.          If you have any questions please do not hesitate to call me at the phone number listed below.    Sincerely,          TINO Bhardwaj.  [phone]

## 2024-11-13 DIAGNOSIS — R11.0 NAUSEA: ICD-10-CM

## 2024-11-13 RX ORDER — ONDANSETRON 4 MG/1
4 TABLET, ORALLY DISINTEGRATING ORAL EVERY 4 HOURS PRN
Qty: 30 TABLET | Refills: 0 | Status: SHIPPED | OUTPATIENT
Start: 2024-11-13 | End: 2024-11-23

## 2025-02-17 ENCOUNTER — OFFICE VISIT (OUTPATIENT)
Dept: URGENT CARE | Facility: PHYSICIAN GROUP | Age: 60
End: 2025-02-17
Payer: MEDICAID

## 2025-02-17 VITALS
BODY MASS INDEX: 22.76 KG/M2 | HEIGHT: 67 IN | SYSTOLIC BLOOD PRESSURE: 110 MMHG | HEART RATE: 86 BPM | WEIGHT: 145 LBS | DIASTOLIC BLOOD PRESSURE: 74 MMHG | OXYGEN SATURATION: 97 % | TEMPERATURE: 97.5 F | RESPIRATION RATE: 14 BRPM

## 2025-02-17 DIAGNOSIS — R09.81 NASAL CONGESTION: ICD-10-CM

## 2025-02-17 PROCEDURE — 3074F SYST BP LT 130 MM HG: CPT

## 2025-02-17 PROCEDURE — 99213 OFFICE O/P EST LOW 20 MIN: CPT

## 2025-02-17 PROCEDURE — 3078F DIAST BP <80 MM HG: CPT

## 2025-02-17 RX ORDER — DICLOFENAC SODIUM 75 MG/1
1 TABLET, DELAYED RELEASE ORAL 2 TIMES DAILY
COMMUNITY
Start: 2024-04-09

## 2025-02-17 RX ORDER — MEMANTINE HYDROCHLORIDE 5 MG/1
1 TABLET ORAL 2 TIMES DAILY
COMMUNITY

## 2025-02-17 RX ORDER — MEMANTINE HYDROCHLORIDE 10 MG/1
1 TABLET ORAL 2 TIMES DAILY
COMMUNITY

## 2025-02-17 RX ORDER — NALDEMEDINE 0.2 MG/1
TABLET ORAL
COMMUNITY

## 2025-02-17 RX ORDER — DICLOFENAC SODIUM 75 MG/1
1 TABLET, DELAYED RELEASE ORAL 2 TIMES DAILY
COMMUNITY

## 2025-02-17 RX ORDER — NALOXEGOL OXALATE 25 MG/1
TABLET, FILM COATED ORAL
COMMUNITY
Start: 2024-07-24

## 2025-02-17 RX ORDER — OXYCODONE AND ACETAMINOPHEN 5; 325 MG/1; MG/1
TABLET ORAL
COMMUNITY
Start: 2024-05-29

## 2025-02-17 RX ORDER — CYCLOBENZAPRINE HCL 10 MG
1 TABLET ORAL 3 TIMES DAILY PRN
COMMUNITY

## 2025-02-17 RX ORDER — HYDROXYZINE PAMOATE 50 MG/1
2 CAPSULE ORAL 2 TIMES DAILY
COMMUNITY

## 2025-02-17 RX ORDER — FLUTICASONE PROPIONATE 50 MCG
1 SPRAY, SUSPENSION (ML) NASAL DAILY
Qty: 16 G | Refills: 0 | Status: SHIPPED | OUTPATIENT
Start: 2025-02-17

## 2025-02-17 RX ORDER — PROMETHAZINE HYDROCHLORIDE 25 MG/1
TABLET ORAL
COMMUNITY

## 2025-02-17 NOTE — LETTER
AMANDA  [facility] URGENT [facility]ARE [address] [address]     February 17, 2025    Patient: Hui Brian   YOB: 1965   Date of Visit: 2/17/2025       To Whom It May Concern:    Hui Brian was seen and treated in our department on 2/17/2025.     Please excuse Hui from work 2/16/25.           Sincerely,     TINO Michel.

## 2025-02-17 NOTE — PROGRESS NOTES
Subjective     Hui Brian is a 59 y.o. female who presents with Letter for School/Work (For yesterday )    HPI:   Hui is a 60yo female presenting for nasal congestion x1 month. Denies sinus pain or pressure. No fever. Denies neck stiffness, no visual changes. Denies hearing loss or ear pain. No abdominal pain, vomiting, or diarrhea. Denies shortness of breath or wheezing. She is requesting a work note for missing work due to symptoms.     Review of Systems   Constitutional:  Negative for chills, fever and malaise/fatigue.   HENT:  Positive for congestion. Negative for ear discharge, ear pain, sinus pain and sore throat.    Eyes:  Negative for blurred vision, double vision, photophobia, pain, discharge and redness.   Respiratory:  Positive for cough. Negative for sputum production, shortness of breath, wheezing and stridor.    Cardiovascular:  Negative for chest pain, palpitations and leg swelling.   Gastrointestinal:  Negative for abdominal pain, diarrhea, nausea and vomiting.   Musculoskeletal:  Negative for myalgias and neck pain.   Skin:  Negative for rash.   Neurological:  Negative for dizziness, focal weakness, weakness and headaches.     Past Medical History:   Diagnosis Date    ALCOHOL ABUSE, IN REMISSION     ASTHMA     prn inhaler    Dental disorder     full dentures    Depression     PTSD/anxiety    Indigestion     Pain     hands/knees     Past Surgical History:   Procedure Laterality Date    BOUCHRA BY LAPAROSCOPY N/A 3/29/2019    Procedure: CHOLECYSTECTOMY, LAPAROSCOPIC;  Surgeon: Astrid Enamorado M.D.;  Location: SURGERY SAME DAY [facility];  Service: General    OTHER  1998    deviated nasal septum    OTHER  1992    face recontruction due to dog attack     Allergies: Erythromycin, Gabapentin, Lyrica [pregabalin], Tape, Tetanus toxoid, and Trazodone     Current Outpatient Medications:     oxyCODONE-acetaminophen (PERCOCET) 5-325 MG Tab, Take 1 tablet 3 times a day by oral route for 30  "days., Disp: , Rfl:     hydrOXYzine pamoate (VISTARIL) 50 MG Cap, TAKE 2 CAPSULES BY MOUTH TWICE A DAY, Disp: 360 Capsule, Rfl: 1    albuterol 108 (90 Base) MCG/ACT Aero Soln inhalation aerosol, Inhale 2 Puffs every 6 hours as needed for Shortness of Breath., Disp: , Rfl:     OXcarbazepine (TRILEPTAL) 300 MG Tab, TAKE 1 TABLET BY MOUTH TWICE DAILY FOR NERVE PAIN RELIEF, Disp: , Rfl:     Naldemedine Tosylate (SYMPROIC) 0.2 MG Tab, , Disp: , Rfl:     Social History     Tobacco Use    Smoking status: Every Day     Current packs/day: 0.50     Average packs/day: 0.5 packs/day for 20.0 years (10.0 ttl pk-yrs)     Types: Cigarettes    Smokeless tobacco: Never   Vaping Use    Vaping status: Never Used   Substance Use Topics    Alcohol use: Not Currently     Comment: hx etoh abuse    Drug use: Yes     Types: Marijuana, Inhaled     Family History   Problem Relation Age of Onset    Psychiatric Illness Mother     Melanoma Maternal Grandmother      Medications, Allergies, and current problem list reviewed today in Epic.      Objective     /74   Pulse 86   Temp 36.4 °C (97.5 °F) (Temporal)   Resp 14   Ht 1.702 m (5' 7\")   Wt 65.8 kg (145 lb)   LMP 12/03/2009   SpO2 97%   BMI 22.71 kg/m²      Physical Exam  Vitals reviewed.   Constitutional:       General: She is not in acute distress.  HENT:      Head: No right periorbital erythema or left periorbital erythema.      Jaw: There is normal jaw occlusion. No tenderness, swelling, pain on movement or malocclusion.      Right Ear: Tympanic membrane, ear canal and external ear normal.      Left Ear: Tympanic membrane, ear canal and external ear normal.      Nose: Congestion present.      Right Turbinates: Not enlarged or swollen.      Left Turbinates: Not enlarged or swollen.      Right Sinus: No maxillary sinus tenderness or frontal sinus tenderness.      Left Sinus: No maxillary sinus tenderness or frontal sinus tenderness.      Mouth/Throat:      Lips: No lesions.      " Mouth: Mucous membranes are moist. No oral lesions or angioedema.      Tongue: No lesions. Tongue does not deviate from midline.      Palate: No mass and lesions.      Pharynx: Uvula midline. No oropharyngeal exudate, posterior oropharyngeal erythema or uvula swelling.      Tonsils: No tonsillar abscesses.   Eyes:      General: Vision grossly intact. Gaze aligned appropriately. No visual field deficit.     Extraocular Movements: Extraocular movements intact.      Right eye: No nystagmus.      Left eye: No nystagmus.      Conjunctiva/sclera: Conjunctivae normal.      Pupils: Pupils are equal, round, and reactive to light.      Right eye: Pupil is round, reactive and not sluggish.      Left eye: Pupil is round, reactive and not sluggish.      Funduscopic exam:     Right eye: Red reflex present.         Left eye: Red reflex present.  Neck:      Trachea: Trachea normal. No abnormal tracheal secretions or tracheal deviation.   Cardiovascular:      Rate and Rhythm: Normal rate and regular rhythm.      Pulses: Normal pulses.      Heart sounds: Normal heart sounds.   Pulmonary:      Effort: Pulmonary effort is normal. No tachypnea, accessory muscle usage, prolonged expiration, respiratory distress or retractions.      Breath sounds: Normal breath sounds. No stridor, decreased air movement or transmitted upper airway sounds. No wheezing, rhonchi or rales.   Musculoskeletal:         General: Normal range of motion.      Cervical back: Full passive range of motion without pain, normal range of motion and neck supple. No erythema, rigidity, tenderness or crepitus. No pain with movement. Normal range of motion.   Lymphadenopathy:      Cervical: No cervical adenopathy.   Skin:     General: Skin is warm and dry.      Findings: No rash.   Neurological:      Mental Status: She is alert. Mental status is at baseline.      Sensory: Sensation is intact.      Motor: Motor function is intact.      Coordination: Coordination is intact.       Gait: Gait is intact.   Psychiatric:         Mood and Affect: Mood normal.         Behavior: Behavior normal.         Thought Content: Thought content normal.       Assessment & Plan    1. Nasal congestion   - fluticasone (FLONASE) 50 MCG/ACT nasal spray; Administer 1 Spray into affected nostril(S) every day.  Dispense: 16 g; Refill: 0       MDM/Comments:   Patient with nasal congestion. No evidence of sinusitis.   No signs of bacterial infection on exam   Lung sounds present and clear throughout all lung fields      Encouraged conservative treatment.  Work note provided per request.     Recommended supportive care. Increased fluids and rest.   Discussed use of nedi-pot, humidifier, and Flonase nasal spray as well. Discussed good hand hygiene and ways to decrease spread of disease. Follow-up with PCP return for reevaluation if symptoms persist/worsen. The patient demonstrated a good understanding and agreed with the treatment plan.        Illness progression and alarm symptoms discussed with patient, emphasizing low threshold for returning to clinic/emergency department for worsening symptoms. Patient is agreeable to the plan and verbalizes understanding, and will follow up if warranted.        Differential diagnosis, supportive care, and indications for immediate follow-up discussed with patient.  Instructed to return to clinic or nearest emergency department for any change in condition, further concerns, or worsening of symptoms.     Recommended supportive treatment at home:     - Use a humidifier, especially at night. Cold or warm water humidifiers have the same effect.  - Trace Med squeeze bottle sinus rinses or plain nasal saline twice a day.  - Hot tea + honey + fresh lemon juice  - Frequent hand washing, rest, hydration  - Warm salt water gargles at least twice a day       Follow up with primary care provider. Follow up urgently for worsening symptoms, persistent fevers, facial swelling, visual changes,  weakness, elevated heart rate, stiff neck, prolonged cough, persistent wheezing, leg swelling, or any other concerns. Seek emergency medical care immediately for: Trouble breathing, persistent pain or pressure in the chest, confusion, inability to wake or stay awake, bluish lips or face, persistent tachycardia (fast heart rate), prolonged dizziness, persistent high grade fevers.                                         Electronically signed by TOMA Hernandez

## 2025-02-18 ASSESSMENT — ENCOUNTER SYMPTOMS
PALPITATIONS: 0
EYE PAIN: 0
DIZZINESS: 0
FEVER: 0
NAUSEA: 0
DIARRHEA: 0
PHOTOPHOBIA: 0
VOMITING: 0
SHORTNESS OF BREATH: 0
WEAKNESS: 0
EYE REDNESS: 0
WHEEZING: 0
BLURRED VISION: 0
ABDOMINAL PAIN: 0
MYALGIAS: 0
DOUBLE VISION: 0
FOCAL WEAKNESS: 0
SINUS PAIN: 0
HEADACHES: 0
NECK PAIN: 0
CHILLS: 0
EYE DISCHARGE: 0
SORE THROAT: 0
STRIDOR: 0
COUGH: 1
SPUTUM PRODUCTION: 0

## 2025-02-18 ASSESSMENT — VISUAL ACUITY: OU: 1

## 2025-04-02 ENCOUNTER — OFFICE VISIT (OUTPATIENT)
Dept: URGENT CARE | Facility: PHYSICIAN GROUP | Age: 60
End: 2025-04-02
Payer: MEDICAID

## 2025-04-02 VITALS
WEIGHT: 147.4 LBS | OXYGEN SATURATION: 97 % | BODY MASS INDEX: 23.13 KG/M2 | SYSTOLIC BLOOD PRESSURE: 146 MMHG | RESPIRATION RATE: 16 BRPM | DIASTOLIC BLOOD PRESSURE: 76 MMHG | HEART RATE: 89 BPM | TEMPERATURE: 97.7 F | HEIGHT: 67 IN

## 2025-04-02 DIAGNOSIS — R53.83 OTHER FATIGUE: ICD-10-CM

## 2025-04-02 PROCEDURE — 3078F DIAST BP <80 MM HG: CPT | Performed by: NURSE PRACTITIONER

## 2025-04-02 PROCEDURE — 3077F SYST BP >= 140 MM HG: CPT | Performed by: NURSE PRACTITIONER

## 2025-04-02 PROCEDURE — 99213 OFFICE O/P EST LOW 20 MIN: CPT | Performed by: NURSE PRACTITIONER

## 2025-04-02 NOTE — LETTER
AMANDA  [facility] URGENT CARE [address] [address]     April 2, 2025    Patient: Hui Brian   YOB: 1965   Date of Visit: 4/2/2025       To Whom It May Concern:    Hui Brian was seen and treated in our department on 4/2/2025. Please excuse from work today, 4/2/25.     Sincerely,     TINO Naylor.

## 2025-04-02 NOTE — PROGRESS NOTES
Chief Complaint   Patient presents with    Fatigue     Pt states she has had fatigue, she needs a doctors note for work since she called out.           History of Present Illness  The patient is a 59-year-old female who presents for a work note.   Patient reports she is not sleeping well secondary to chronic cervical radiculopathy.  She is under the care of spine and will be undergoing surgery.  She does not present to clinic for evaluation of her cervical radiculopathy rather than a note to excuse her from work as she did not sleep well last night.  No loss of bowel or bladder control fever body aches saddle anesthesia.    She works 12-hour shifts and is often asked to work overtime, which has resulted in fatigue. She missed work today but is not scheduled to return until Friday.     Her blood pressure tends to elevate during doctor's visits.         ROS:      As otherwise stated in HPI    Medical/SX/ Social History:  Reviewed per chart    Pertinent Medications:    Current Outpatient Medications on File Prior to Visit   Medication Sig Dispense Refill    promethazine (PHENERGAN) 25 MG Tab       diclofenac DR (VOLTAREN) 75 MG Tablet Delayed Response Take 1 Tablet by mouth 2 times a day.      memantine (NAMENDA) 10 MG Tab Take 1 Tablet by mouth 2 times a day.      oxyCODONE-acetaminophen (PERCOCET) 5-325 MG Tab Take 1 tablet 3 times a day by oral route for 30 days.      fluticasone (FLONASE) 50 MCG/ACT nasal spray Administer 1 Spray into affected nostril(S) every day. 16 g 0    oxyCODONE-acetaminophen (PERCOCET) 5-325 MG Tab Take 1 Tablet by mouth 3 times a day.      hydrOXYzine pamoate (VISTARIL) 50 MG Cap TAKE 2 CAPSULES BY MOUTH TWICE A  Capsule 1    cyclobenzaprine (FLEXERIL) 10 mg Tab Take 1 Tablet by mouth 3 times a day as needed for Moderate Pain. 360 Tablet 0    albuterol 108 (90 Base) MCG/ACT Aero Soln inhalation aerosol Inhale 2 Puffs every 6 hours as needed for Shortness of Breath.      OXcarbazepine  (TRILEPTAL) 300 MG Tab TAKE 1 TABLET BY MOUTH TWICE DAILY FOR NERVE PAIN RELIEF      Naldemedine Tosylate (SYMPROIC) 0.2 MG Tab  (Patient not taking: Reported on 4/2/2025)      Naloxegol Oxalate (MOVANTIK) 25 MG Tab Take 1 tablet every day by oral route for 30 days. (Patient not taking: Reported on 4/2/2025)      cyclobenzaprine (FLEXERIL) 10 mg Tab Take 1 Tablet by mouth 3 times a day as needed. (Patient not taking: Reported on 4/2/2025)      diclofenac DR (VOLTAREN) 75 MG Tablet Delayed Response Take 1 Tablet by mouth 2 times a day. (Patient not taking: Reported on 4/2/2025)      hydrOXYzine pamoate (VISTARIL) 50 MG Cap Take 2 Capsules by mouth 2 times a day. (Patient not taking: Reported on 4/2/2025)      memantine (NAMENDA) 5 MG Tab Take 1 Tablet by mouth 2 times a day. (Patient not taking: Reported on 4/2/2025)      memantine (NAMENDA) 5 MG Tab Take 5 mg by mouth 2 times a day. (Patient not taking: Reported on 4/2/2025)       No current facility-administered medications on file prior to visit.        Allergies:    Erythromycin, Gabapentin, Lyrica [pregabalin], Tape, Tetanus toxoid, and Trazodone     Problem list, medications, and allergies reviewed by myself today in Epic     Physical Exam:    Vitals:    04/02/25 1008   BP: (!) 146/76   Pulse: 89   Resp: 16   Temp: 36.5 °C (97.7 °F)   SpO2: 97%             Physical Exam  Constitutional:       Appearance: Normal appearance.   Pulmonary:      Effort: Pulmonary effort is normal.   Skin:     General: Skin is warm.      Capillary Refill: Capillary refill takes less than 2 seconds.   Neurological:      Mental Status: She is alert and oriented to person, place, and time.      Gait: Gait is intact.   Psychiatric:         Behavior: Behavior normal.                  Medical Decision making and plan :  I personally reviewed prior external notes and test results pertinent to today's visit. Pt is clinically stable at today's acute urgent care visit.  Patient appears  nontoxic with no acute distress noted. Appropriate for outpatient care at this time.    Pleasant 59 y.o. female presented clinic with:     Assessment & Plan  1.  Encounter for work note secondary to fatigue due to poor sleeping  She reports ongoing pain and numbness in her left arm due to two vertebrae pinching the nerve root. Despite receiving her third epidural, the relief is diminishing, and symptoms are returning more quickly.  This is caused her to not sleep well and she is requesting a note. . A note excusing her from work for today has been provided.    2. Elevated blood pressure.  Her blood pressure is slightly elevated, potentially due to stress related to her cervical issues and impending surgery. She is advised to monitor her blood pressure closely to ensure it does not remain high.    PROCEDURE  The patient recently received her third epidural injection for cervical radiculopathy.      Shared decision-making was utilized with patient for treatment plan. Medication discussed included indication for use and the potential benefits and side effects. Education was provided regarding the aforementioned assessments.  Differential Diagnosis, natural history, and supportive care discussed. All of the patient's questions were answered to their satisfaction at the time of discharge. Patient was encouraged to monitor symptoms closely. Those signs and symptoms which would warrant concern and mandate seeking a higher level of service through the emergency department discussed at length.  Patient stated agreement and understanding of this plan of care.    Disposition:  Home in stable condition     Voice Recognition Disclaimer:  Portions of this document were created using voice recognition software and [employer] technology provided by [employer].  Patient was informed of [email or web address] technology being used.  The software does have a chance of producing errors of grammar and possibly content. I have made every reasonable attempt to  correct obvious errors, but there could be errors of grammar and possibly content that I did not discover before finalizing the documentation.    MARIA GUADALUPE [email or web address].

## 2025-06-06 ENCOUNTER — OFFICE VISIT (OUTPATIENT)
Dept: URGENT CARE | Facility: PHYSICIAN GROUP | Age: 60
End: 2025-06-06
Payer: MEDICAID

## 2025-06-06 VITALS
DIASTOLIC BLOOD PRESSURE: 70 MMHG | HEART RATE: 83 BPM | RESPIRATION RATE: 20 BRPM | BODY MASS INDEX: 22.98 KG/M2 | TEMPERATURE: 97.1 F | WEIGHT: 146.4 LBS | OXYGEN SATURATION: 98 % | HEIGHT: 67 IN | SYSTOLIC BLOOD PRESSURE: 122 MMHG

## 2025-06-06 DIAGNOSIS — Z56.6 OTHER PHYSICAL AND MENTAL STRAIN RELATED TO WORK: ICD-10-CM

## 2025-06-06 DIAGNOSIS — R09.81 NASAL CONGESTION: ICD-10-CM

## 2025-06-06 PROCEDURE — 3078F DIAST BP <80 MM HG: CPT | Performed by: PHYSICIAN ASSISTANT

## 2025-06-06 PROCEDURE — 99213 OFFICE O/P EST LOW 20 MIN: CPT | Performed by: PHYSICIAN ASSISTANT

## 2025-06-06 PROCEDURE — 3074F SYST BP LT 130 MM HG: CPT | Performed by: PHYSICIAN ASSISTANT

## 2025-06-06 RX ORDER — FLUTICASONE PROPIONATE 50 MCG
1 SPRAY, SUSPENSION (ML) NASAL DAILY
Qty: 16 G | Refills: 3 | Status: SHIPPED | OUTPATIENT
Start: 2025-06-06

## 2025-06-06 SDOH — HEALTH STABILITY - MENTAL HEALTH: OTHER PHYSICAL AND MENTAL STRAIN RELATED TO WORK: Z56.6

## 2025-06-06 NOTE — LETTER
June 6, 2025         Patient: Hui Brian   YOB: 1965   Date of Visit: 6/6/2025           To Whom it May Concern:    Hui Brian was seen in my clinic on 6/6/2025. She is excused from work today due to acute condition.    If you have any questions or concerns, please don't hesitate to call.        Sincerely,           Geo Knott P.A.-C.  Electronically Signed

## 2025-06-06 NOTE — PROGRESS NOTES
"Subjective     Hui Brian is a very pleasant 59 y.o. female who presents with Letter for School/Work (Today - Pt sts she has had mandatory OT x 2 weeks and she feels exhausted, pt sts she is also waiting for surgery on her neck ) and Medication Refill (Flonase )            HPI  Patient requesting note for work due to fatigue and exhaustion.  She had 2 weeks of mandatory overtime and she is feeling rundown.  No acute infectious symptoms.    Refill of Flonase request for nasal congestion and allergic rhinitis      PMH:  has a past medical history of ALCOHOL ABUSE, IN REMISSION, ASTHMA, Dental disorder, Depression, Indigestion, and Pain.  MEDS: Current Medications[1]  ALLERGIES: Allergies[2]  SURGHX: Past Surgical History[3]  SOCHX:  reports that she has been smoking cigarettes. She has a 10 pack-year smoking history. She has never used smokeless tobacco. She reports that she does not currently use alcohol. She reports current drug use. Drugs: Marijuana and Inhaled.  FH: family history includes Melanoma in her maternal grandmother; Psychiatric Illness in her mother.      Review of Systems   Constitutional:  Positive for malaise/fatigue.   Endo/Heme/Allergies:  Positive for environmental allergies.   All other systems reviewed and are negative.      Medications, Allergies, and current problem list reviewed today in Epic           Objective     /70   Pulse 83   Temp 36.2 °C (97.1 °F) (Temporal)   Resp 20   Ht 1.702 m (5' 7\")   Wt 66.4 kg (146 lb 6.4 oz)   LMP 12/03/2009   SpO2 98%   BMI 22.93 kg/m²      Physical Exam  Vitals and nursing note reviewed.   Constitutional:       General: She is not in acute distress.     Appearance: Normal appearance. She is well-developed. She is not ill-appearing, toxic-appearing or diaphoretic.   HENT:      Head: Normocephalic and atraumatic.      Right Ear: Hearing and external ear normal.      Left Ear: Hearing and external ear normal.      Nose: Congestion " present. No rhinorrhea.   Eyes:      General:         Right eye: No discharge.         Left eye: No discharge.      Conjunctiva/sclera: Conjunctivae normal.   Cardiovascular:      Rate and Rhythm: Normal rate and regular rhythm.      Heart sounds: Normal heart sounds.   Pulmonary:      Effort: Pulmonary effort is normal. No respiratory distress.      Breath sounds: Normal breath sounds. No stridor. No wheezing, rhonchi or rales.   Musculoskeletal:      Cervical back: Normal range of motion and neck supple.   Skin:     General: Skin is warm and dry.   Neurological:      General: No focal deficit present.      Mental Status: She is alert and oriented to person, place, and time.   Psychiatric:         Mood and Affect: Mood normal.         Behavior: Behavior normal.         Thought Content: Thought content normal.         Judgment: Judgment normal.                                  Assessment & Plan  Other physical and mental strain related to work  This is a very pleasant 59-year-old female presenting with exhaustion and fatigue due to 2 weeks of mandatory overtime.  No acute or concerning symptoms.  Vital signs stable.  I did happily provide patient a work excuse       Nasal congestion    Orders:    fluticasone (FLONASE) 50 MCG/ACT nasal spray; Administer 1 Spray into affected nostril(S) every day.         I personally reviewed prior external notes and test results pertinent to today's visit. Return to clinic or go to ED if symptoms worsen or persist. Red flag symptoms and indications for ED discussed at length. Patient/Parent/Guardian voices understanding.  AVS with post-visit instructions printed and provided or given verbally.  Follow-up with your primary care provider in 3-5 days. All side effects and potential interactions of prescribed medication discussed including allergic response, GI upset, tendon injury, rash, sedation, OCP effectiveness, etc.    Please note that this dictation was created using voice  recognition software. I have made every reasonable attempt to correct obvious errors, but I expect that there are errors of grammar and possibly content that I did not discover before finalizing the note.               [1]   Current Outpatient Medications:     fluticasone (FLONASE) 50 MCG/ACT nasal spray, Administer 1 Spray into affected nostril(S) every day., Disp: 16 g, Rfl: 3    promethazine (PHENERGAN) 25 MG Tab, , Disp: , Rfl:     diclofenac DR (VOLTAREN) 75 MG Tablet Delayed Response, Take 1 Tablet by mouth 2 times a day., Disp: , Rfl:     memantine (NAMENDA) 10 MG Tab, Take 1 Tablet by mouth 2 times a day., Disp: , Rfl:     hydrOXYzine pamoate (VISTARIL) 50 MG Cap, TAKE 2 CAPSULES BY MOUTH TWICE A DAY, Disp: 360 Capsule, Rfl: 1    cyclobenzaprine (FLEXERIL) 10 mg Tab, Take 1 Tablet by mouth 3 times a day as needed for Moderate Pain., Disp: 360 Tablet, Rfl: 0    albuterol 108 (90 Base) MCG/ACT Aero Soln inhalation aerosol, Inhale 2 Puffs every 6 hours as needed for Shortness of Breath., Disp: , Rfl:     OXcarbazepine (TRILEPTAL) 300 MG Tab, TAKE 1 TABLET BY MOUTH TWICE DAILY FOR NERVE PAIN RELIEF, Disp: , Rfl:   [2]   Allergies  Allergen Reactions    Erythromycin Hives    Gabapentin      Pruritic rash    Lyrica [Pregabalin] Rash     Pruritic rash    Tape Itching    Tetanus Toxoid Itching    Trazodone      Other Reaction(s): restless leg symptom    Pt. reports restless legs as a reaction to trazodone   [3]   Past Surgical History:  Procedure Laterality Date    BOUCHRA BY LAPAROSCOPY N/A 3/29/2019    Procedure: CHOLECYSTECTOMY, LAPAROSCOPIC;  Surgeon: Astrid Enamorado M.D.;  Location: SURGERY SAME DAY [facility];  Service: General    OTHER  1998    deviated nasal septum    OTHER  1992    face recontruction due to dog attack

## 2025-06-27 ENCOUNTER — OFFICE VISIT (OUTPATIENT)
Dept: URGENT CARE | Facility: PHYSICIAN GROUP | Age: 60
End: 2025-06-27
Payer: MEDICAID

## 2025-06-27 VITALS
WEIGHT: 144 LBS | HEIGHT: 67 IN | BODY MASS INDEX: 22.6 KG/M2 | SYSTOLIC BLOOD PRESSURE: 126 MMHG | TEMPERATURE: 98.4 F | OXYGEN SATURATION: 96 % | HEART RATE: 79 BPM | DIASTOLIC BLOOD PRESSURE: 80 MMHG | RESPIRATION RATE: 18 BRPM

## 2025-06-27 DIAGNOSIS — R53.83 OTHER FATIGUE: Primary | ICD-10-CM

## 2025-06-27 PROCEDURE — 99212 OFFICE O/P EST SF 10 MIN: CPT | Performed by: NURSE PRACTITIONER

## 2025-06-27 PROCEDURE — 3079F DIAST BP 80-89 MM HG: CPT | Performed by: NURSE PRACTITIONER

## 2025-06-27 PROCEDURE — 3074F SYST BP LT 130 MM HG: CPT | Performed by: NURSE PRACTITIONER

## 2025-06-27 RX ORDER — OXYCODONE AND ACETAMINOPHEN 5; 325 MG/1; MG/1
1 TABLET ORAL EVERY 8 HOURS PRN
COMMUNITY
Start: 2025-06-17

## 2025-06-27 NOTE — LETTER
June 27, 2025    To Whom It May Concern:         This is confirmation that Hui Brian attended her scheduled appointment with PAPITO Bhardwaj on 6/27/25. Please excuse her absence due to an acute condition. She may return on 6/28/2025.          If you have any questions please do not hesitate to call me at the phone number listed below.    Sincerely,          TINO Bhardwaj.  [phone]

## 2025-06-27 NOTE — PROGRESS NOTES
"Subjective:     Hui Brian is a 59 y.o. female who presents for Letter for School/Work (Pt presents to get a work note to excuse her from work today.)      HPI  Pt presents for evaluation of a new problem. Hui is a very pleasant 59-year-old female presents to urgent care today with complaints of profound fatigue.  She states that she is being worked up for anemia and has lab work from her PCP at [place].  She was unable to attend work today due to her excessive fatigue and is requesting a work note.  She denies any fever, sore throat, cough, congestion, nausea/vomiting or diarrhea.    ROS    PMH: Past Medical History[1]  ALLERGIES: Allergies[2]  SURGHX: Past Surgical History[3]  SOCHX: Social History[4]  FH:   Family History   Problem Relation Age of Onset    Psychiatric Illness Mother     Melanoma Maternal Grandmother          Objective:   /80 (BP Location: Right arm, Patient Position: Sitting, BP Cuff Size: Adult)   Pulse 79   Temp 36.9 °C (98.4 °F) (Temporal)   Resp 18   Ht 1.702 m (5' 7\")   Wt 65.3 kg (144 lb)   LMP 12/03/2009   SpO2 96%   BMI 22.55 kg/m²     Physical Exam  Vitals and nursing note reviewed.   Constitutional:       General: She is not in acute distress.     Appearance: Normal appearance. She is normal weight. She is not ill-appearing or toxic-appearing.   HENT:      Head: Normocephalic.      Right Ear: External ear normal.      Left Ear: External ear normal.      Nose: No congestion or rhinorrhea.      Mouth/Throat:      Pharynx: No oropharyngeal exudate or posterior oropharyngeal erythema.   Eyes:      General:         Right eye: No discharge.         Left eye: No discharge.      Pupils: Pupils are equal, round, and reactive to light.   Cardiovascular:      Rate and Rhythm: Normal rate and regular rhythm.   Pulmonary:      Effort: Pulmonary effort is normal.   Abdominal:      General: Abdomen is flat.   Musculoskeletal:         General: Normal range of motion.      " Cervical back: Normal range of motion and neck supple.   Skin:     General: Skin is dry.   Neurological:      General: No focal deficit present.      Mental Status: She is alert and oriented to person, place, and time. Mental status is at baseline.   Psychiatric:         Mood and Affect: Mood normal.         Behavior: Behavior normal.         Thought Content: Thought content normal.         Judgment: Judgment normal.         Assessment/Plan:   Assessment      1. Other fatigue          Work note given today patient may return to work tomorrow.  Return as needed.           [1]   Past Medical History:  Diagnosis Date    ALCOHOL ABUSE, IN REMISSION     ASTHMA     prn inhaler    Dental disorder     full dentures    Depression     PTSD/anxiety    Indigestion     Pain     hands/knees   [2]   Allergies  Allergen Reactions    Erythromycin Hives    Gabapentin      Pruritic rash    Lyrica [Pregabalin] Rash     Pruritic rash    Tape Itching    Tetanus Toxoid Itching    Trazodone      Other Reaction(s): restless leg symptom    Pt. 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

## 2025-07-21 ENCOUNTER — OFFICE VISIT (OUTPATIENT)
Dept: URGENT CARE | Facility: PHYSICIAN GROUP | Age: 60
End: 2025-07-21
Payer: MEDICAID

## 2025-07-21 VITALS
TEMPERATURE: 97.8 F | BODY MASS INDEX: 22.26 KG/M2 | DIASTOLIC BLOOD PRESSURE: 66 MMHG | WEIGHT: 141.8 LBS | HEART RATE: 76 BPM | SYSTOLIC BLOOD PRESSURE: 132 MMHG | OXYGEN SATURATION: 98 % | HEIGHT: 67 IN | RESPIRATION RATE: 17 BRPM

## 2025-07-21 DIAGNOSIS — M65.311 TRIGGER FINGER OF RIGHT THUMB: Primary | ICD-10-CM

## 2025-07-21 PROCEDURE — 3075F SYST BP GE 130 - 139MM HG: CPT | Performed by: NURSE PRACTITIONER

## 2025-07-21 PROCEDURE — 99213 OFFICE O/P EST LOW 20 MIN: CPT | Performed by: NURSE PRACTITIONER

## 2025-07-21 PROCEDURE — 1125F AMNT PAIN NOTED PAIN PRSNT: CPT | Performed by: NURSE PRACTITIONER

## 2025-07-21 PROCEDURE — 3078F DIAST BP <80 MM HG: CPT | Performed by: NURSE PRACTITIONER

## 2025-07-21 ASSESSMENT — PAIN SCALES - GENERAL: PAINLEVEL_OUTOF10: 6=MODERATE PAIN

## 2025-07-21 NOTE — LETTER
July 21, 2025    To Whom It May Concern:         This is confirmation that Hui Brian attended her scheduled appointment with PAPITO Bhardwaj on 7/21/25. Please excuse her absence due to an acute condition. She may return on 7/23/2024 or sooner if better.          If you have any questions please do not hesitate to call me at the phone number listed below.    Sincerely,          TINO Bhardwaj.  [phone]

## 2025-07-21 NOTE — PROGRESS NOTES
"Subjective:     Hui Brian is a 59 y.o. female who presents for Joint Injection (R- thumb pain, making clicking sounds x 2weeks )      HPI  Pt presents for evaluation of a new problem.  Katharina is a pleasant 59-year-old female presents to urgent care today with complaints of right sided thumb pain that started approximately 2 weeks ago.  She states that she is now developing clicking and popping when bending her right thumb.  She notes that this is extremely painful.  She denies any known injury.  She currently is a patient of pain management and has been using her prescribed medication for her discomfort.    ROS    PMH: Past Medical History[1]  ALLERGIES: Allergies[2]  SURGHX: Past Surgical History[3]  SOCHX: Social History[4]  FH:   Family History   Problem Relation Age of Onset    Psychiatric Illness Mother     Melanoma Maternal Grandmother          Objective:   /66 (BP Location: Right arm, Patient Position: Sitting, BP Cuff Size: Adult)   Pulse 76   Temp 36.6 °C (97.8 °F) (Temporal)   Resp 17   Ht 1.702 m (5' 7\")   Wt 64.3 kg (141 lb 12.8 oz)   LMP 12/03/2009   SpO2 98%   BMI 22.21 kg/m²     Physical Exam  Vitals and nursing note reviewed.   Constitutional:       General: She is not in acute distress.     Appearance: Normal appearance. She is normal weight. She is not ill-appearing or toxic-appearing.   HENT:      Head: Normocephalic.      Right Ear: External ear normal.      Left Ear: External ear normal.      Nose: No congestion or rhinorrhea.      Mouth/Throat:      Pharynx: No oropharyngeal exudate or posterior oropharyngeal erythema.   Eyes:      General:         Right eye: No discharge.         Left eye: No discharge.      Pupils: Pupils are equal, round, and reactive to light.   Cardiovascular:      Rate and Rhythm: Normal rate and regular rhythm.      Pulses: Normal pulses.      Heart sounds: Normal heart sounds.   Pulmonary:      Effort: Pulmonary effort is normal. "   Abdominal:      General: Abdomen is flat.   Musculoskeletal:         General: Normal range of motion.      Cervical back: Normal range of motion and neck supple.      Comments: Right thumb with minimal swelling.  Negative for erythema.  There is tenderness with active and passive range of motion of PIP joint.  Palpable popping and clicking present.   Skin:     General: Skin is dry.   Neurological:      General: No focal deficit present.      Mental Status: She is alert and oriented to person, place, and time. Mental status is at baseline.   Psychiatric:         Mood and Affect: Mood normal.         Behavior: Behavior normal.         Thought Content: Thought content normal.         Judgment: Judgment normal.         Assessment/Plan:   Assessment      1. Trigger finger of right thumb  Referral to Orthopedics      Differentials discussed with patient.  At this time I do believe she is suffering from a trigger finger of her right thumb.  She was placed in thumb spica brace for comfort and support and given referral to follow-up with orthopedics.  Patient may use ice and prescribed pain medication for relief of discomfort.  She is in agreement with plan of care.         [1]   Past Medical History:  Diagnosis Date    ALCOHOL ABUSE, IN REMISSION     ASTHMA     prn inhaler    Dental disorder     full dentures    Depression     PTSD/anxiety    Indigestion     Pain     hands/knees   [2]   Allergies  Allergen Reactions    Erythromycin Hives    Gabapentin      Pruritic rash    Lyrica [Pregabalin] Rash     Pruritic rash    Tape Itching    Tetanus Toxoid Itching    Trazodone      Other Reaction(s): restless leg symptom    Pt. 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

## 2025-07-25 NOTE — Clinical Note
REFERRAL APPROVAL NOTICE         Sent on July 25, 2025                   Hui Brian  [address]  [place] [address]                   Dear Ms. Brian,    After a careful review of the medical information and benefit coverage, [facility] has processed your referral. See below for additional details.    If applicable, you must be actively enrolled with your insurance for coverage of the authorized service. If you have any questions regarding your coverage, please contact your insurance directly.    REFERRAL INFORMATION   Referral #:  [id number]  Referred-To Provider    Referred-By Provider:  Orthopedic Surgery    PAPITO Bhardwaj MICHAEL B      [address]  [address]  [facility] [address]  [phone] [id number]  [address]  [facility] [id number] [phone]    Referral Start Date:  07/21/2025  Referral End Date:   07/21/2026             SCHEDULING  If you do not already have an appointment, please call [phone] to make an appointment.     MORE INFORMATION  If you do not already have a [facility] account, sign up at: [email or web address]  You can access your medical information, make appointments, see lab results, billing information, and more.  If you have questions regarding this referral, please contact  the [facility] Referrals department at:             [phone]. Monday - Friday 8:00AM - 5:00PM.     Sincerely,    [facility]

## 2025-08-13 ENCOUNTER — OFFICE VISIT (OUTPATIENT)
Dept: URGENT CARE | Facility: PHYSICIAN GROUP | Age: 60
End: 2025-08-13
Payer: MEDICAID

## 2025-08-13 VITALS
BODY MASS INDEX: 21.97 KG/M2 | TEMPERATURE: 97.8 F | RESPIRATION RATE: 16 BRPM | HEIGHT: 67 IN | WEIGHT: 140 LBS | HEART RATE: 96 BPM | DIASTOLIC BLOOD PRESSURE: 78 MMHG | SYSTOLIC BLOOD PRESSURE: 118 MMHG | OXYGEN SATURATION: 95 %

## 2025-08-13 DIAGNOSIS — R53.83 OTHER FATIGUE: ICD-10-CM

## 2025-08-13 DIAGNOSIS — Z56.6 OTHER PHYSICAL AND MENTAL STRAIN RELATED TO WORK: ICD-10-CM

## 2025-08-13 PROCEDURE — 3074F SYST BP LT 130 MM HG: CPT

## 2025-08-13 PROCEDURE — 3078F DIAST BP <80 MM HG: CPT

## 2025-08-13 PROCEDURE — 99213 OFFICE O/P EST LOW 20 MIN: CPT

## 2025-08-13 RX ORDER — NAPROXEN 500 MG/1
TABLET ORAL
COMMUNITY
End: 2025-08-13

## 2025-08-13 SDOH — HEALTH STABILITY - MENTAL HEALTH: OTHER PHYSICAL AND MENTAL STRAIN RELATED TO WORK: Z56.6

## 2025-08-13 ASSESSMENT — ENCOUNTER SYMPTOMS: FATIGUE: 1

## 2025-08-14 ASSESSMENT — ENCOUNTER SYMPTOMS
VOMITING: 0
PALPITATIONS: 0
SHORTNESS OF BREATH: 0
DIZZINESS: 0
ABDOMINAL PAIN: 0
WEAKNESS: 0
SWOLLEN GLANDS: 0
SORE THROAT: 0
DIARRHEA: 0
COUGH: 0
WHEEZING: 0
NAUSEA: 0
NECK PAIN: 0
HEADACHES: 0
FEVER: 0
VISUAL CHANGE: 0
CHILLS: 0

## 2025-08-15 ENCOUNTER — HOSPITAL ENCOUNTER (OUTPATIENT)
Dept: LAB | Facility: MEDICAL CENTER | Age: 60
End: 2025-08-15
Payer: MEDICAID

## 2025-08-15 LAB
T4 FREE SERPL-MCNC: 1.14 NG/DL (ref 0.93–1.7)
TSH SERPL DL<=0.005 MIU/L-ACNC: 0.19 UIU/ML (ref 0.38–5.33)

## 2025-08-15 PROCEDURE — 84443 ASSAY THYROID STIM HORMONE: CPT

## 2025-08-15 PROCEDURE — 36415 COLL VENOUS BLD VENIPUNCTURE: CPT

## 2025-08-15 PROCEDURE — 84439 ASSAY OF FREE THYROXINE: CPT

## (undated) DEVICE — CATHETER IV 20 GA X 1-1/4 ---SURG.& SDS ONLY--- (50EA/BX)

## (undated) DEVICE — LACTATED RINGERS INJ 1000 ML - (14EA/CA 60CA/PF)

## (undated) DEVICE — SUCTION INSTRUMENT YANKAUER BULBOUS TIP W/O VENT (50EA/CA)

## (undated) DEVICE — PACK LAP CHOLE OR - (2EA/CA)

## (undated) DEVICE — CANISTER SUCTION RIGID RED 1500CC (40EA/CA)

## (undated) DEVICE — SUTURE 4-0 MONOCRYL PLUS PS-2 - 27 INCH (36/BX)

## (undated) DEVICE — SENSOR SPO2 NEO LNCS ADHESIVE (20/BX) SEE USER NOTES

## (undated) DEVICE — MASK ANESTHESIA ADULT  - (100/CA)

## (undated) DEVICE — DRESSING TRANSPARENT FILM TEGADERM 2.375 X 2.75"  (100EA/BX)"

## (undated) DEVICE — TUBING CLEARLINK DUO-VENT - C-FLO (48EA/CA)

## (undated) DEVICE — BLADE SURGICAL CLIPPER - (50EA/CA)

## (undated) DEVICE — SPONGE GAUZESTER. 2X2 4-PL - (2/PK 50PK/BX 30BX/CS)

## (undated) DEVICE — HEAD HOLDER JUNIOR/ADULT

## (undated) DEVICE — NEEDLE INSUFFLATION FOR STEP - (12/BX)

## (undated) DEVICE — SUTURE GENERAL

## (undated) DEVICE — TROCAR 5X100 BLADED Z-THREAD - KII (6/BX)

## (undated) DEVICE — NEPTUNE 4 PORT MANIFOLD - (20/PK)

## (undated) DEVICE — CLIP MED LG INTNL HRZN TI ESCP - (20/BX)

## (undated) DEVICE — SODIUM CHL IRRIGATION 0.9% 1000ML (12EA/CA)

## (undated) DEVICE — GLOVE BIOGEL SZ 8.5 SURGICAL PF LTX - (50PR/BX 4BX/CA)

## (undated) DEVICE — TROCAR Z THREAD 11 X 100 - BLADED (6/BX)

## (undated) DEVICE — SET LEADWIRE 5 LEAD BEDSIDE DISPOSABLE ECG (1SET OF 5/EA)

## (undated) DEVICE — GLOVE BIOGEL INDICATOR SZ 6.5 SURGICAL PF LTX - (50PR/BX 4BX/CA)

## (undated) DEVICE — TUBING SETDISPOS HIGH FLOW II - (10/BX)

## (undated) DEVICE — WATER IRRIGATION STERILE 1000ML (12EA/CA)

## (undated) DEVICE — CLOSURE SKIN STRIP 1/2 X 4 IN - (STERI STRIP) (50/BX 4BX/CA)

## (undated) DEVICE — BAG RETRIEVAL 10ML (10EA/BX)

## (undated) DEVICE — CHLORAPREP 26 ML APPLICATOR - ORANGE TINT(25/CA)

## (undated) DEVICE — KIT ANESTHESIA W/CIRCUIT & 3/LT BAG W/FILTER (20EA/CA)

## (undated) DEVICE — CANISTER SUCTION 3000ML MECHANICAL FILTER AUTO SHUTOFF MEDI-VAC NONSTERILE LF DISP  (40EA/CA)

## (undated) DEVICE — PROTECTOR ULNA NERVE - (36PR/CA)

## (undated) DEVICE — SET EXTENSION WITH 2 PORTS (48EA/CA) ***PART #2C8610 IS A SUBSTITUTE*****

## (undated) DEVICE — CANNULA W/SEAL 5X100 Z-THRE - ADED KII (12/BX)

## (undated) DEVICE — ELECTRODE DUAL RETURN W/ CORD - (50/PK)

## (undated) DEVICE — KIT  I.V. START (100EA/CA)

## (undated) DEVICE — TUBE CONNECTING SUCTION - CLEAR PLASTIC STERILE 72 IN (50EA/CA)

## (undated) DEVICE — TUBE NG SALEM SUMP 16FR (50EA/CA)